# Patient Record
Sex: FEMALE | Race: WHITE | Employment: OTHER | URBAN - METROPOLITAN AREA
[De-identification: names, ages, dates, MRNs, and addresses within clinical notes are randomized per-mention and may not be internally consistent; named-entity substitution may affect disease eponyms.]

---

## 2017-02-27 PROBLEM — R07.2 PRECORDIAL PAIN: Status: ACTIVE | Noted: 2017-02-27

## 2020-04-16 ENCOUNTER — HOSPITAL ENCOUNTER (OUTPATIENT)
Dept: CARDIAC CATH/INVASIVE PROCEDURES | Age: 70
Discharge: HOME | End: 2020-04-16
Attending: INTERNAL MEDICINE | Admitting: INTERNAL MEDICINE
Payer: MEDICARE

## 2020-04-16 VITALS
OXYGEN SATURATION: 97 % | RESPIRATION RATE: 22 BRPM | WEIGHT: 138 LBS | SYSTOLIC BLOOD PRESSURE: 124 MMHG | HEIGHT: 61 IN | HEART RATE: 75 BPM | DIASTOLIC BLOOD PRESSURE: 67 MMHG | BODY MASS INDEX: 26.06 KG/M2 | TEMPERATURE: 97.8 F

## 2020-04-16 LAB
ANION GAP SERPL CALC-SCNC: 6 MMOL/L (ref 7–16)
APTT PPP: 31.2 SEC (ref 24.3–35.4)
ATRIAL RATE: 62 BPM
BUN SERPL-MCNC: 19 MG/DL (ref 8–23)
CALCIUM SERPL-MCNC: 9.2 MG/DL (ref 8.3–10.4)
CALCULATED P AXIS, ECG09: 64 DEGREES
CALCULATED R AXIS, ECG10: -28 DEGREES
CALCULATED T AXIS, ECG11: 38 DEGREES
CHLORIDE SERPL-SCNC: 104 MMOL/L (ref 98–107)
CO2 SERPL-SCNC: 30 MMOL/L (ref 21–32)
CREAT SERPL-MCNC: 0.66 MG/DL (ref 0.6–1)
DIAGNOSIS, 93000: NORMAL
ERYTHROCYTE [DISTWIDTH] IN BLOOD BY AUTOMATED COUNT: 12.7 % (ref 11.9–14.6)
GLUCOSE SERPL-MCNC: 105 MG/DL (ref 65–100)
HCT VFR BLD AUTO: 44 % (ref 35.8–46.3)
HGB BLD-MCNC: 14.1 G/DL (ref 11.7–15.4)
INR PPP: 0.9
MCH RBC QN AUTO: 30.3 PG (ref 26.1–32.9)
MCHC RBC AUTO-ENTMCNC: 32 G/DL (ref 31.4–35)
MCV RBC AUTO: 94.4 FL (ref 79.6–97.8)
NRBC # BLD: 0 K/UL (ref 0–0.2)
P-R INTERVAL, ECG05: 154 MS
PLATELET # BLD AUTO: 335 K/UL (ref 150–450)
PMV BLD AUTO: 9.4 FL (ref 9.4–12.3)
POTASSIUM SERPL-SCNC: 3.9 MMOL/L (ref 3.5–5.1)
PROTHROMBIN TIME: 12.6 SEC (ref 12–14.7)
Q-T INTERVAL, ECG07: 428 MS
QRS DURATION, ECG06: 84 MS
QTC CALCULATION (BEZET), ECG08: 434 MS
RBC # BLD AUTO: 4.66 M/UL (ref 4.05–5.2)
SODIUM SERPL-SCNC: 140 MMOL/L (ref 136–145)
VENTRICULAR RATE, ECG03: 62 BPM
WBC # BLD AUTO: 6.3 K/UL (ref 4.3–11.1)

## 2020-04-16 PROCEDURE — C1769 GUIDE WIRE: HCPCS

## 2020-04-16 PROCEDURE — C1894 INTRO/SHEATH, NON-LASER: HCPCS

## 2020-04-16 PROCEDURE — 74011250636 HC RX REV CODE- 250/636: Performed by: INTERNAL MEDICINE

## 2020-04-16 PROCEDURE — 99152 MOD SED SAME PHYS/QHP 5/>YRS: CPT

## 2020-04-16 PROCEDURE — 74011636320 HC RX REV CODE- 636/320: Performed by: INTERNAL MEDICINE

## 2020-04-16 PROCEDURE — 80048 BASIC METABOLIC PNL TOTAL CA: CPT

## 2020-04-16 PROCEDURE — 77030016699 HC CATH ANGI DX INFN1 CARD -A

## 2020-04-16 PROCEDURE — 93005 ELECTROCARDIOGRAM TRACING: CPT | Performed by: INTERNAL MEDICINE

## 2020-04-16 PROCEDURE — 77030029997 HC DEV COM RDL R BND TELE -B

## 2020-04-16 PROCEDURE — 85027 COMPLETE CBC AUTOMATED: CPT

## 2020-04-16 PROCEDURE — 77030015766

## 2020-04-16 PROCEDURE — 85730 THROMBOPLASTIN TIME PARTIAL: CPT

## 2020-04-16 PROCEDURE — 93458 L HRT ARTERY/VENTRICLE ANGIO: CPT

## 2020-04-16 PROCEDURE — 74011000250 HC RX REV CODE- 250: Performed by: INTERNAL MEDICINE

## 2020-04-16 PROCEDURE — 85610 PROTHROMBIN TIME: CPT

## 2020-04-16 RX ORDER — HEPARIN SODIUM 200 [USP'U]/100ML
3 INJECTION, SOLUTION INTRAVENOUS CONTINUOUS
Status: DISCONTINUED | OUTPATIENT
Start: 2020-04-16 | End: 2020-04-16 | Stop reason: HOSPADM

## 2020-04-16 RX ORDER — SODIUM CHLORIDE 0.9 % (FLUSH) 0.9 %
5-40 SYRINGE (ML) INJECTION EVERY 8 HOURS
Status: DISCONTINUED | OUTPATIENT
Start: 2020-04-16 | End: 2020-04-16 | Stop reason: HOSPADM

## 2020-04-16 RX ORDER — LIDOCAINE HYDROCHLORIDE 10 MG/ML
1-30 INJECTION, SOLUTION EPIDURAL; INFILTRATION; INTRACAUDAL; PERINEURAL ONCE
Status: COMPLETED | OUTPATIENT
Start: 2020-04-16 | End: 2020-04-16

## 2020-04-16 RX ORDER — MIDAZOLAM HYDROCHLORIDE 1 MG/ML
.5-2 INJECTION, SOLUTION INTRAMUSCULAR; INTRAVENOUS
Status: DISCONTINUED | OUTPATIENT
Start: 2020-04-16 | End: 2020-04-16 | Stop reason: HOSPADM

## 2020-04-16 RX ORDER — SODIUM CHLORIDE 9 MG/ML
75 INJECTION, SOLUTION INTRAVENOUS CONTINUOUS
Status: DISCONTINUED | OUTPATIENT
Start: 2020-04-16 | End: 2020-04-16 | Stop reason: HOSPADM

## 2020-04-16 RX ORDER — SODIUM CHLORIDE 0.9 % (FLUSH) 0.9 %
5-40 SYRINGE (ML) INJECTION AS NEEDED
Status: DISCONTINUED | OUTPATIENT
Start: 2020-04-16 | End: 2020-04-16 | Stop reason: HOSPADM

## 2020-04-16 RX ORDER — GUAIFENESIN 100 MG/5ML
81-324 LIQUID (ML) ORAL
Status: DISCONTINUED | OUTPATIENT
Start: 2020-04-16 | End: 2020-04-16 | Stop reason: HOSPADM

## 2020-04-16 RX ORDER — FENTANYL CITRATE 50 UG/ML
25-75 INJECTION, SOLUTION INTRAMUSCULAR; INTRAVENOUS
Status: DISCONTINUED | OUTPATIENT
Start: 2020-04-16 | End: 2020-04-16 | Stop reason: HOSPADM

## 2020-04-16 RX ADMIN — LIDOCAINE HYDROCHLORIDE 4 ML: 10 INJECTION, SOLUTION EPIDURAL; INFILTRATION; INTRACAUDAL; PERINEURAL at 08:57

## 2020-04-16 RX ADMIN — MIDAZOLAM 1 MG: 1 INJECTION INTRAMUSCULAR; INTRAVENOUS at 08:58

## 2020-04-16 RX ADMIN — MIDAZOLAM 1 MG: 1 INJECTION INTRAMUSCULAR; INTRAVENOUS at 08:54

## 2020-04-16 RX ADMIN — HEPARIN SODIUM 3 UNITS/HR: 200 INJECTION, SOLUTION INTRAVENOUS at 08:39

## 2020-04-16 RX ADMIN — SODIUM CHLORIDE 75 ML/HR: 900 INJECTION, SOLUTION INTRAVENOUS at 07:07

## 2020-04-16 RX ADMIN — IOPAMIDOL 80 ML: 755 INJECTION, SOLUTION INTRAVENOUS at 09:08

## 2020-04-16 RX ADMIN — VERAPAMIL HYDROCHLORIDE 2 ML: 5 INJECTION INTRAVENOUS at 09:01

## 2020-04-16 RX ADMIN — FENTANYL CITRATE 25 MCG: 50 INJECTION, SOLUTION INTRAMUSCULAR; INTRAVENOUS at 08:55

## 2020-04-16 NOTE — PROCEDURES
Brief Cardiac Procedure Note    Patient: Kitty Espinosa MRN: [id number]  SSN: [id number]    YOB: 1950  Age: 81 y.o. Sex: female      Date of Procedure: 4/16/2020     Pre-procedure Diagnosis: Atypical Angina    Post-procedure Diagnosis: Non-cardiac Chest Pain    Reason for Procedure: New Onset Angina < or = 2 Months    Procedure: Left Heart Catheterization    Brief Description of Procedure: lhc via right radial, tr    Performed By: Bennie Starks MD     Assistants: none    Anesthesia: Moderate Sedation    Estimated Blood Loss: Less than 10 mL      Specimens: None    Implants: None    Findings: normal cors and ef    Complications: None    Recommendations: Continue medical therapy.     Signed By: Bennie Starks MD     April 16, 2020

## 2020-04-16 NOTE — PROGRESS NOTES
Patient arrived and ambulated to room with no visual problems for planned Lhc/poss with Dr. Liv Barajas. Consent signed and procedure discussed with patient. Time allow for patient to verbalize concerns, and concerns addressed with voiced understanding. Medications and history reviewed with patient. Patient prepped for procedure as ordered. Patient took Aspirin 325 mg at 0400, see medication record. The patient has a fraility score of 3-MANAGING WELL, based on Patient can complete ADL's without difficulty or assistance.

## 2020-04-16 NOTE — ROUTINE PROCESS
TRANSFER - OUT REPORT:    [facility]  Dr. Conde Heads  RRA access    Versed 2mg, fentanyl 25mcg  Diagnostic cath, medical management  R band placed @ [phone] with 12ml air    Verbal report given to [facility] RN(name) on [employer]  being transferred to cpru(unit) for routine progression of care       Report consisted of patients Situation, Background, Assessment and   Recommendations(SBAR). Information from the following report(s) Procedure Summary was reviewed with the receiving nurse. Lines:   Peripheral IV 04/16/20 Anterior;Proximal;Right Forearm (Active)       Peripheral IV 04/16/20 Anterior;Left;Proximal Forearm (Active)        Opportunity for questions and clarification was provided.       Patient transported with:   Registered Nurse

## 2020-04-16 NOTE — PROGRESS NOTES
Terumo band completely deflated. 1105 Terumo band removed from right wrist using sterile technique. Sterile dressing applied. No signs and symptoms of bleeding, oozing or hematoma.

## 2020-04-16 NOTE — PROGRESS NOTES
Report received from [address] [place] [address] Procedural findings communicated. Intra procedural  medication administration reviewed. Progression of care discussed.      Patient received into CPRU [place] 12 post sheath removal.     Access site without bleeding or swelling yes    Dressing dry and intact yes    Patient instructed to limit movement to right upper extremity    Routine post procedural vital signs and site assessment initiated yes

## 2020-04-16 NOTE — DISCHARGE INSTRUCTIONS
HEART CATHETERIZATION/ANGIOGRAPHY DISCHARGE INSTRUCTIONS    1. Check puncture site frequently for swelling or bleeding. If there is any bleeding, lie down and apply pressure over the area with a clean towel or washcloth and call 911. Notify your doctor for any redness, swelling, drainage, or oozing from the puncture site. Notify your doctor for any fever or chills. 2. If the extremity becomes cold, numb, or painful call Dr. Isael Yoon at [phone].  3. Activity should be limited for the next 48 hours. Avoid pushing, pulling and bending of affected wrist for 48 hours. No heavy lifting (anything over 5 pounds) for 3 days. No driving for 48 hours. 4. You may resume your usual diet. Drink more fluids than usual.  5. Have a responsible person drive you home and stay with you for at least 24 hours after your heart catheterization/angiography. 6. You may remove bandage from your right arm  in 24 hours. You may shower in 24 hours. No tub baths, hot tubs, or swimming for 1 week. Do not place any lotions, creams, powders, or ointments over puncture site for 1 week. You may place a clean band-aid over the puncture site each day for 5 days. Change daily. I have read the above instructions and have had the opportunity to ask questions.

## 2020-04-16 NOTE — PROCEDURES
[facility]  CARDIAC CATH    Name:  Bo Pineda  MR#:  [id number]  :  1950  ACCOUNT #:  [de-identified]  DATE OF SERVICE:  2020    PROCEDURES PERFORMED:  Left heart catheterization via the right radial artery with a 5-Icelandic Tiger catheter and angled pigtail. TR band was placed for good hemostasis. PREOPERATIVE DIAGNOSES:  Chest pain, abnormal EKG. POSTOPERATIVE DIAGNOSES:  Noncardiac chest pain. SURGEON:  Parker Rivera MD    ASSISTANT:  None. ESTIMATED BLOOD LOSS:  5 mL. SPECIMENS REMOVED:  None. COMPLICATIONS:  None. IMPLANTS:  None. ANESTHESIA:  Was provided by Dilcia Leonardo RN beginning at 08:54 and concluding at 09:14 with a total of 2 mg Versed and 25 mcg of fentanyl. Vital signs and saturations were stable throughout. FINDINGS:  The left main coronary artery is in normal anatomic position. Bifurcates into LAD and circumflex system. Left-sided vessels are small, but there is no atherosclerosis seen in the left main. The LAD has a proximal small-to-moderate, but long diagonal branch, two smaller distal diagonal branches. There is no atherosclerosis in the LAD system. The circumflex has two distal obtuse marginal branches. There is no atherosclerosis in the circumflex system. The right coronary artery is a dominant vessel in normal anatomic position with a PDA and four small posterolateral obtuse marginal branches. There is no atherosclerosis seen in the circumflex system. Left ventriculogram reveals normal LV systolic function. The ejection fraction is 60%. Left ventricular end-diastolic pressure is measured at 8 mmHg with an opening aortic pressure of 138/74. There is no gradient across the aortic valve. CONCLUSION:  Normal coronary angiography with normal LV systolic function.         Zander Velásquez MD      [id number]  D:  2020 9:21  T:  2020 10:09  JOB #:  [id number]

## 2020-04-16 NOTE — PROGRESS NOTES
Patient up to bedside, vital signs and site stable. Patient ambulated to bathroom without difficulty. Patient voided without difficulty. Vascular site stable. 1115 Discharge instructions and home medications reviewed with patient. Time allowed for questions and answers. 1130 Patient ambulated second time without difficulty. Site stable after ambulation. Peripheral IV sites dc'd without difficulty with tips intact. Patient discharged to home with family.

## 2022-03-19 PROBLEM — R07.2 PRECORDIAL PAIN: Status: ACTIVE | Noted: 2017-02-27

## 2023-01-10 ENCOUNTER — OFFICE VISIT (OUTPATIENT)
Dept: ORTHOPEDIC SURGERY | Age: 73
End: 2023-01-10

## 2023-01-10 VITALS — WEIGHT: 141 LBS | BODY MASS INDEX: 26.62 KG/M2 | HEIGHT: 61 IN

## 2023-01-10 DIAGNOSIS — M17.11 PRIMARY OSTEOARTHRITIS OF RIGHT KNEE: ICD-10-CM

## 2023-01-10 DIAGNOSIS — M25.561 RIGHT KNEE PAIN, UNSPECIFIED CHRONICITY: Primary | ICD-10-CM

## 2023-01-10 RX ORDER — METHYLPREDNISOLONE ACETATE 40 MG/ML
40 INJECTION, SUSPENSION INTRA-ARTICULAR; INTRALESIONAL; INTRAMUSCULAR; SOFT TISSUE ONCE
Status: COMPLETED | OUTPATIENT
Start: 2023-01-10 | End: 2023-01-10

## 2023-01-10 RX ADMIN — METHYLPREDNISOLONE ACETATE 40 MG: 40 INJECTION, SUSPENSION INTRA-ARTICULAR; INTRALESIONAL; INTRAMUSCULAR; SOFT TISSUE at 11:55

## 2023-01-10 NOTE — PROGRESS NOTES
Patient ID:  Gume Bean  [id number]  68 y.o.  1950    Today: January 10, 2023          Chief Complaint:  Right Knee pain    HPI:       Gume Bean is a 81 y.o. female seen for evaluation and treatment of right knee pain. Patient reports a longstanding history of pain involving the knee. The patient complains of knee pain with activities, reports pain as mostly occurring along the joint lines, reports stiffness of the knee with prolonged inactivity, and swelling/pain at the end of the day and after increased physical activity. Generally, symptoms improve with sitting/rest. The pain affects the patients activities of daily living and quality of life. Patient reports progressive pain and instability in the knee. The pain has been present for an extended period of time. Pain ranges from approximately 4-8 in a cyclical fashion with periods of acute exacerbation. Patient has been attempted prior conservative treatment including Over-the-Counter medications including NSAID and/or Tylenol, Activity Modifications, and Corticosteroid Injection. They have had success with prior treatments. Prior Corticosteroid Injection has provided complete and lasting relief. Past Medical History:  Past Medical History:   Diagnosis Date    Arthritis     GERD (gastroesophageal reflux disease)     Thyroid disease        Past Surgical History:  Past Surgical History:   Procedure Laterality Date    APPENDECTOMY      HYSTERECTOMY      ORTHOPEDIC SURGERY      toe surgery, both hands    UROLOGICAL      bladder         Medications:     Prior to Admission medications    Medication Sig Start Date End Date Taking?  Authorizing Provider   KRILL OIL PO Take by mouth    Ar Automatic Reconciliation   albuterol sulfate HFA (PROAIR HFA) 108 (90 Base) MCG/ACT inhaler INHALE 2 PUFFS Q 4 H PRF WHEEZING 10/27/16   Ar Automatic Reconciliation   Hyoscyamine Sulfate SL 0.125 MG SUBL Place 0.125 mg under the tongue every 4 hours as needed Ar Automatic Reconciliation   levothyroxine (SYNTHROID) 100 MCG tablet Take by mouth every morning (before breakfast)    Ar Automatic Reconciliation   pantoprazole (PROTONIX) 20 MG tablet Take 20 mg by mouth daily    Ar Automatic Reconciliation   sulfamethoxazole-trimethoprim (BACTRIM;SEPTRA) 400-80 MG per tablet Take 1 tablet by mouth daily as needed    Ar Automatic Reconciliation       Family History:     Family History   Problem Relation Age of Onset    Heart Attack Father     Heart Attack Brother 39    Heart Attack Sister 36       Social History:      Social History     Tobacco Use    Smoking status: Never    Smokeless tobacco: Never   Substance Use Topics    Alcohol use: Not on file           Allergies: Allergies   Allergen Reactions    Levofloxacin Anaphylaxis    Morphine Nausea And Vomiting          Vitals:   Ht 5' 1\" (1.549 m)   Wt 141 lb (64 kg)   BMI 26.64 kg/m²     ROS:   Review of Systems         Objective:   General: Patient is awake and in no acute distress  Psych: Mood and affect appropriate  HEENT: Normocephalic. Atramatic. Pupils equal, round and reactive. Sclera normal.   Neck: Supple without obvious mass   Chest: Symmetric  Lungs:  Breathing non-labored. No tachypnea noted. Abdomen: Soft on gross examination without obvious distention. Neuro: No obvious neurologic deficit. Grossly moves bilateral upper extremities without motor or sensory deficits. No gross weakness noted in the lower extremities. No hyporeflexia or hyperreflexia noted. Vascular: No gross arterial or venous deficiency noted. DP and PT pulses are palpable in the lower extremities  Lymphatic: No lymphedema noted in the lower extremities. Skin: No prior incisions noted about the right knee. No obvious rashes noted about the area. No skin changes noted about the knee or about the adjacent thigh or leg. Extremities:  Patient ambulates with an antalgic gait. There is pain with ROM of the right knee.  Range of motion is 0-120. Trace effusion noted in the knee. Patellofemoral crepitus is present. Distally the patient shows no neurologic deficit. Xrays (obtained either today or previously):    Heading: XR Knee 3/4 View  Views: AP knee, skiers PA knee, lateral knee, sunrise view right knee  Clinical indication: Right Knee Pain   Findings: Xrays of the knees obtained today or previously show tricompartmental bone-on-bone arthritic changes with associated osteophyte formation and subchondral sclerosis. Impression: Right Knee osteoarthritis    Johnie Fenton MD    Assessment:   1. Arthritis of the Right knee    Plan:   Differential diagnosis and treatment options have been discussed with the patient. Risks, benefits and alternatives of each were discussed and patient questions answered. The patient understands the nature of knee arthritis and that this is generally a progressive condition. We discussed oral anti-inflammatory medications, activity modifications, physical therapy, corticosteroid injections, weight loss (if appropriate), and surgery. We discussed that given the degenerative nature of the joint that in most cases surgery is the definitive treatment for this condition. We did discuss some of the details of surgery along with some of the risks, benefits and alternatives. At this point the patient is a candidate for surgery however they would like to try to postpone surgery at this point in time if possible. At this point the patient has failed the aforementioned treatment modalities. At this point the patient would like to proceed with Corticosteroid Injection. TREATMENT:    Steroid Injection - Risks, benefits, and alternatives of corticosteroid injection were discussed. After any questions were address the patient wished to proceed with injection. After prepping the injection site and right knee was injected with 1cc of 40mg Depomedrol/3cc marcaine. Patient tolerated the procedure well.  Patient is instructed to ice the joint for next few days if they experience discomfort.  The patient will followup as directed or as needed        Signed By: Torres Vizcarra MD  January 10, 2023

## 2023-05-11 ENCOUNTER — HOSPITAL ENCOUNTER (OUTPATIENT)
Dept: MRI IMAGING | Age: 73
Discharge: HOME | End: 2023-05-11
Payer: MEDICARE

## 2023-05-11 ENCOUNTER — TRANSCRIBE ORDERS (OUTPATIENT)
Dept: SCHEDULING | Age: 73
End: 2023-05-11

## 2023-05-11 DIAGNOSIS — I77.6 AORTITIS (HCC): ICD-10-CM

## 2023-05-11 DIAGNOSIS — I77.810 THORACIC AORTIC ECTASIA (HCC): ICD-10-CM

## 2023-05-11 DIAGNOSIS — I77.812: ICD-10-CM

## 2023-05-11 DIAGNOSIS — I77.6 AORTITIS (HCC): Primary | ICD-10-CM

## 2023-05-11 PROCEDURE — 6360000004 HC RX CONTRAST MEDICATION: Performed by: INTERNAL MEDICINE

## 2023-05-11 PROCEDURE — A9579 GAD-BASE MR CONTRAST NOS,1ML: HCPCS | Performed by: INTERNAL MEDICINE

## 2023-05-11 PROCEDURE — C8911 MRA W/O FOL W/CONT, CHEST: HCPCS

## 2023-05-11 PROCEDURE — 2580000003 HC RX 258: Performed by: INTERNAL MEDICINE

## 2023-05-11 RX ORDER — SODIUM CHLORIDE 0.9 % (FLUSH) 0.9 %
10 SYRINGE (ML) INJECTION AS NEEDED
Status: DISCONTINUED | OUTPATIENT
Start: 2023-05-11 | End: 2023-05-15 | Stop reason: HOSPADM

## 2023-05-11 RX ADMIN — GADOTERIDOL 15 ML: 279.3 INJECTION, SOLUTION INTRAVENOUS at 15:27

## 2023-05-11 RX ADMIN — SODIUM CHLORIDE, PRESERVATIVE FREE 10 ML: 5 INJECTION INTRAVENOUS at 15:30

## 2023-05-14 ENCOUNTER — APPOINTMENT (OUTPATIENT)
Dept: GENERAL RADIOLOGY | Age: 73
End: 2023-05-14
Payer: MEDICARE

## 2023-05-14 ENCOUNTER — HOSPITAL ENCOUNTER (EMERGENCY)
Age: 73
Discharge: HOME | End: 2023-05-14
Attending: EMERGENCY MEDICINE
Payer: MEDICARE

## 2023-05-14 ENCOUNTER — APPOINTMENT (OUTPATIENT)
Dept: CT IMAGING | Age: 73
End: 2023-05-14
Payer: MEDICARE

## 2023-05-14 VITALS
WEIGHT: 134 LBS | OXYGEN SATURATION: 98 % | DIASTOLIC BLOOD PRESSURE: 80 MMHG | BODY MASS INDEX: 26.31 KG/M2 | RESPIRATION RATE: 14 BRPM | HEIGHT: 60 IN | SYSTOLIC BLOOD PRESSURE: 130 MMHG | HEART RATE: 66 BPM | TEMPERATURE: 98 F

## 2023-05-14 DIAGNOSIS — I77.6 AORTITIS (HCC): Primary | ICD-10-CM

## 2023-05-14 DIAGNOSIS — R10.84 GENERALIZED ABDOMINAL PAIN: ICD-10-CM

## 2023-05-14 PROBLEM — K21.9 GASTROESOPHAGEAL REFLUX DISEASE: Status: ACTIVE | Noted: 2021-08-14

## 2023-05-14 PROBLEM — K58.0 IRRITABLE BOWEL SYNDROME WITH DIARRHEA: Status: ACTIVE | Noted: 2021-08-14

## 2023-05-14 PROBLEM — E03.9 HYPOTHYROIDISM, ADULT: Status: ACTIVE | Noted: 2021-08-14

## 2023-05-14 LAB
ALBUMIN SERPL-MCNC: 3.6 G/DL (ref 3.2–4.6)
ALBUMIN/GLOB SERPL: 0.9 (ref 0.4–1.6)
ALP SERPL-CCNC: 86 U/L (ref 50–136)
ALT SERPL-CCNC: 24 U/L (ref 12–65)
ANION GAP SERPL CALC-SCNC: 4 MMOL/L (ref 2–11)
AST SERPL-CCNC: 13 U/L (ref 15–37)
BILIRUB SERPL-MCNC: 0.4 MG/DL (ref 0.2–1.1)
BUN SERPL-MCNC: 12 MG/DL (ref 8–23)
CALCIUM SERPL-MCNC: 9.5 MG/DL (ref 8.3–10.4)
CHLORIDE SERPL-SCNC: 107 MMOL/L (ref 101–110)
CO2 SERPL-SCNC: 28 MMOL/L (ref 21–32)
CREAT SERPL-MCNC: 0.61 MG/DL (ref 0.6–1)
EKG ATRIAL RATE: 66 BPM
EKG DIAGNOSIS: NORMAL
EKG P AXIS: 60 DEGREES
EKG P-R INTERVAL: 148 MS
EKG Q-T INTERVAL: 397 MS
EKG QRS DURATION: 95 MS
EKG QTC CALCULATION (BAZETT): 420 MS
EKG R AXIS: -24 DEGREES
EKG T AXIS: 52 DEGREES
EKG VENTRICULAR RATE: 67 BPM
ERYTHROCYTE [DISTWIDTH] IN BLOOD BY AUTOMATED COUNT: 13.3 % (ref 11.9–14.6)
GLOBULIN SER CALC-MCNC: 4.1 G/DL (ref 2.8–4.5)
GLUCOSE SERPL-MCNC: 99 MG/DL (ref 65–100)
HCT VFR BLD AUTO: 41.4 % (ref 35.8–46.3)
HGB BLD-MCNC: 13.3 G/DL (ref 11.7–15.4)
LIPASE SERPL-CCNC: 92 U/L (ref 73–393)
MAGNESIUM SERPL-MCNC: 2.2 MG/DL (ref 1.8–2.4)
MCH RBC QN AUTO: 29.8 PG (ref 26.1–32.9)
MCHC RBC AUTO-ENTMCNC: 32.1 G/DL (ref 31.4–35)
MCV RBC AUTO: 92.8 FL (ref 82–102)
NRBC # BLD: 0 K/UL (ref 0–0.2)
PLATELET # BLD AUTO: 354 K/UL (ref 150–450)
PMV BLD AUTO: 9.1 FL (ref 9.4–12.3)
POTASSIUM SERPL-SCNC: 3.8 MMOL/L (ref 3.5–5.1)
PROT SERPL-MCNC: 7.7 G/DL (ref 6.3–8.2)
RBC # BLD AUTO: 4.46 M/UL (ref 4.05–5.2)
SODIUM SERPL-SCNC: 139 MMOL/L (ref 133–143)
TROPONIN I SERPL HS-MCNC: 4.4 PG/ML (ref 0–14)
TROPONIN I SERPL HS-MCNC: 5.2 PG/ML (ref 0–14)
WBC # BLD AUTO: 4.9 K/UL (ref 4.3–11.1)

## 2023-05-14 PROCEDURE — 94762 N-INVAS EAR/PLS OXIMTRY CONT: CPT

## 2023-05-14 PROCEDURE — 83690 ASSAY OF LIPASE: CPT

## 2023-05-14 PROCEDURE — 71045 X-RAY EXAM CHEST 1 VIEW: CPT

## 2023-05-14 PROCEDURE — 71260 CT THORAX DX C+: CPT

## 2023-05-14 PROCEDURE — 83735 ASSAY OF MAGNESIUM: CPT

## 2023-05-14 PROCEDURE — 85027 COMPLETE CBC AUTOMATED: CPT

## 2023-05-14 PROCEDURE — 93005 ELECTROCARDIOGRAM TRACING: CPT | Performed by: EMERGENCY MEDICINE

## 2023-05-14 PROCEDURE — 80053 COMPREHEN METABOLIC PANEL: CPT

## 2023-05-14 PROCEDURE — 99285 EMERGENCY DEPT VISIT HI MDM: CPT

## 2023-05-14 PROCEDURE — 2580000003 HC RX 258: Performed by: EMERGENCY MEDICINE

## 2023-05-14 PROCEDURE — 84484 ASSAY OF TROPONIN QUANT: CPT

## 2023-05-14 PROCEDURE — 93010 ELECTROCARDIOGRAM REPORT: CPT | Performed by: INTERNAL MEDICINE

## 2023-05-14 PROCEDURE — 6360000004 HC RX CONTRAST MEDICATION: Performed by: EMERGENCY MEDICINE

## 2023-05-14 RX ORDER — OXYCODONE HYDROCHLORIDE 5 MG/1
5 TABLET ORAL EVERY 4 HOURS PRN
Qty: 19 TABLET | Refills: 0 | Status: SHIPPED | OUTPATIENT
Start: 2023-05-14 | End: 2023-05-17

## 2023-05-14 RX ORDER — SODIUM CHLORIDE, SODIUM LACTATE, POTASSIUM CHLORIDE, AND CALCIUM CHLORIDE .6; .31; .03; .02 G/100ML; G/100ML; G/100ML; G/100ML
1000 INJECTION, SOLUTION INTRAVENOUS ONCE
Status: COMPLETED | OUTPATIENT
Start: 2023-05-14 | End: 2023-05-14

## 2023-05-14 RX ORDER — SODIUM CHLORIDE 0.9 % (FLUSH) 0.9 %
10 SYRINGE (ML) INJECTION
Status: COMPLETED | OUTPATIENT
Start: 2023-05-14 | End: 2023-05-14

## 2023-05-14 RX ORDER — PREDNISONE 10 MG/1
TABLET ORAL
COMMUNITY
Start: 2023-05-12

## 2023-05-14 RX ADMIN — SODIUM CHLORIDE, POTASSIUM CHLORIDE, SODIUM LACTATE AND CALCIUM CHLORIDE 1000 ML: 600; 310; 30; 20 INJECTION, SOLUTION INTRAVENOUS at 11:13

## 2023-05-14 RX ADMIN — IOPAMIDOL 100 ML: 755 INJECTION, SOLUTION INTRAVENOUS at 12:24

## 2023-05-14 RX ADMIN — SODIUM CHLORIDE, PRESERVATIVE FREE 10 ML: 5 INJECTION INTRAVENOUS at 12:24

## 2023-05-14 ASSESSMENT — ENCOUNTER SYMPTOMS
SHORTNESS OF BREATH: 0
EYE ITCHING: 0
COUGH: 0
EYE REDNESS: 0
ABDOMINAL PAIN: 1
NAUSEA: 0
BACK PAIN: 0
EYE PAIN: 0
WHEEZING: 0
SINUS PAIN: 0
VOMITING: 0
TROUBLE SWALLOWING: 0
CONSTIPATION: 0
DIARRHEA: 0

## 2023-05-14 ASSESSMENT — PAIN SCALES - GENERAL: PAINLEVEL_OUTOF10: 7

## 2023-05-14 ASSESSMENT — PAIN DESCRIPTION - LOCATION: LOCATION: ABDOMEN;CHEST

## 2023-05-14 ASSESSMENT — PAIN - FUNCTIONAL ASSESSMENT: PAIN_FUNCTIONAL_ASSESSMENT: 0-10

## 2023-05-23 ENCOUNTER — OFFICE VISIT (OUTPATIENT)
Dept: VASCULAR SURGERY | Age: 73
End: 2023-05-23
Payer: MEDICARE

## 2023-05-23 VITALS
DIASTOLIC BLOOD PRESSURE: 83 MMHG | HEART RATE: 64 BPM | SYSTOLIC BLOOD PRESSURE: 162 MMHG | OXYGEN SATURATION: 99 % | BODY MASS INDEX: 26.31 KG/M2 | WEIGHT: 134 LBS | HEIGHT: 60 IN

## 2023-05-23 DIAGNOSIS — I71.43 INFRARENAL ABDOMINAL AORTIC ANEURYSM (AAA) WITHOUT RUPTURE (HCC): Primary | ICD-10-CM

## 2023-05-23 PROCEDURE — G8417 CALC BMI ABV UP PARAM F/U: HCPCS | Performed by: SURGERY

## 2023-05-23 PROCEDURE — G8427 DOCREV CUR MEDS BY ELIG CLIN: HCPCS | Performed by: SURGERY

## 2023-05-23 PROCEDURE — 99204 OFFICE O/P NEW MOD 45 MIN: CPT | Performed by: SURGERY

## 2023-05-23 PROCEDURE — 1036F TOBACCO NON-USER: CPT | Performed by: SURGERY

## 2023-05-23 PROCEDURE — 3017F COLORECTAL CA SCREEN DOC REV: CPT | Performed by: SURGERY

## 2023-05-23 PROCEDURE — G8400 PT W/DXA NO RESULTS DOC: HCPCS | Performed by: SURGERY

## 2023-05-23 PROCEDURE — 1090F PRES/ABSN URINE INCON ASSESS: CPT | Performed by: SURGERY

## 2023-05-23 PROCEDURE — 1123F ACP DISCUSS/DSCN MKR DOCD: CPT | Performed by: SURGERY

## 2023-05-23 NOTE — PROGRESS NOTES
[address]   [address] [facility] FAX: [phone]    Matt Cates [facility]  1950    Chief Complaint   Patient presents with    New Patient           HPI   Ms. Celia Nicholas is a 81y.o. year old female who with vague abdominal pain for the last several months. She denies any tobacco abuse. She underwent a CAT scan did show some inflammatory area around the aorta. Recently started on steroids. Current Outpatient Medications   Medication Sig Dispense Refill    predniSONE (DELTASONE) 10 MG tablet Take 30 mg daily for 1 week , then 25 mg daily (2 1/2 tabs) for 1 week, then 20 mg daily for 1 week, then 15 mg daily for 1 week, then stay on 10 mg daily until follow up. KRILL OIL PO Take by mouth      albuterol sulfate HFA (PROVENTIL;VENTOLIN;PROAIR) 108 (90 Base) MCG/ACT inhaler INHALE 2 PUFFS Q 4 H PRF WHEEZING      Hyoscyamine Sulfate SL 0.125 MG SUBL Place 0.125 mg under the tongue every 4 hours as needed      levothyroxine (SYNTHROID) 100 MCG tablet Take by mouth every morning (before breakfast)      pantoprazole (PROTONIX) 20 MG tablet Take 1 tablet by mouth daily      sulfamethoxazole-trimethoprim (BACTRIM;SEPTRA) 400-80 MG per tablet Take 1 tablet by mouth daily as needed (Patient not taking: Reported on 5/23/2023)       No current facility-administered medications for this visit.      Allergies   Allergen Reactions    Levofloxacin Anaphylaxis    Morphine Nausea And Vomiting     Past Medical History:   Diagnosis Date    Arthritis     GERD (gastroesophageal reflux disease)     Thyroid disease      Family History   Problem Relation Age of Onset    Heart Attack Father     Heart Attack Brother 39    Heart Attack Sister 36     Past Surgical History:   Procedure Laterality Date    APPENDECTOMY      HYSTERECTOMY (CERVIX STATUS UNKNOWN)      ORTHOPEDIC SURGERY      toe surgery, both hands    UROLOGICAL SURGERY      bladder      Social History     Tobacco Use

## 2023-06-06 ENCOUNTER — OFFICE VISIT (OUTPATIENT)
Dept: VASCULAR SURGERY | Age: 73
End: 2023-06-06
Payer: MEDICARE

## 2023-06-06 VITALS
BODY MASS INDEX: 27.09 KG/M2 | HEART RATE: 61 BPM | HEIGHT: 60 IN | SYSTOLIC BLOOD PRESSURE: 153 MMHG | OXYGEN SATURATION: 96 % | DIASTOLIC BLOOD PRESSURE: 78 MMHG | WEIGHT: 138 LBS

## 2023-06-06 DIAGNOSIS — I71.43 INFRARENAL ABDOMINAL AORTIC ANEURYSM (AAA) WITHOUT RUPTURE (HCC): Primary | ICD-10-CM

## 2023-06-06 PROCEDURE — G8427 DOCREV CUR MEDS BY ELIG CLIN: HCPCS | Performed by: SURGERY

## 2023-06-06 PROCEDURE — 1123F ACP DISCUSS/DSCN MKR DOCD: CPT | Performed by: SURGERY

## 2023-06-06 PROCEDURE — 1036F TOBACCO NON-USER: CPT | Performed by: SURGERY

## 2023-06-06 PROCEDURE — 99213 OFFICE O/P EST LOW 20 MIN: CPT | Performed by: SURGERY

## 2023-06-06 PROCEDURE — G8400 PT W/DXA NO RESULTS DOC: HCPCS | Performed by: SURGERY

## 2023-06-06 PROCEDURE — G8417 CALC BMI ABV UP PARAM F/U: HCPCS | Performed by: SURGERY

## 2023-06-06 PROCEDURE — 3017F COLORECTAL CA SCREEN DOC REV: CPT | Performed by: SURGERY

## 2023-06-06 PROCEDURE — 1090F PRES/ABSN URINE INCON ASSESS: CPT | Performed by: SURGERY

## 2023-06-06 NOTE — PROGRESS NOTES
[address]   [facility] FAX: [phone]    Gilford Booty  : 1950    Chief Complaint:     History of Present Illness:   Patient follows up today for follow-up aortic ultrasound with history of inflammatory aortic seen on CAT scan. Patient denies any pain she is currently on steroids. CURRENT MEDICATIONS:  Current Outpatient Medications   Medication Sig Dispense Refill    predniSONE (DELTASONE) 10 MG tablet Take 30 mg daily for 1 week , then 25 mg daily (2 1/2 tabs) for 1 week, then 20 mg daily for 1 week, then 15 mg daily for 1 week, then stay on 10 mg daily until follow up. KRILL OIL PO Take by mouth      levothyroxine (SYNTHROID) 100 MCG tablet Take by mouth every morning (before breakfast)      pantoprazole (PROTONIX) 20 MG tablet Take 1 tablet by mouth daily      albuterol sulfate HFA (PROVENTIL;VENTOLIN;PROAIR) 108 (90 Base) MCG/ACT inhaler INHALE 2 PUFFS Q 4 H PRF WHEEZING (Patient not taking: Reported on 2023)      Hyoscyamine Sulfate SL 0.125 MG SUBL Place 0.125 mg under the tongue every 4 hours as needed (Patient not taking: Reported on 2023)      sulfamethoxazole-trimethoprim (BACTRIM;SEPTRA) 400-80 MG per tablet Take 1 tablet by mouth daily as needed (Patient not taking: Reported on 2023)       No current facility-administered medications for this visit. Past Medical History:   Diagnosis Date    Arthritis     GERD (gastroesophageal reflux disease)     Thyroid disease        Physical Examination:   Height: 5' (1.524 m), Weight - Scale: 138 lb (62.6 kg), BP: (!) 153/78    Constitutional: she appears well-developed. No distress. HENT:   Head: Atraumatic. Eyes: Pupils are equal, round, and reactive to light. Neck: Normal range of motion. Cardiovascular: Regular rhythm. Pulmonary/Chest: Effort normal and breath sounds normal. No respiratory distress. Abdominal: Soft.  Bowel sounds are normal. she exhibits no

## 2023-07-03 ENCOUNTER — HOSPITAL ENCOUNTER (OUTPATIENT)
Dept: CT IMAGING | Age: 73
Discharge: HOME | End: 2023-07-06
Attending: SURGERY
Payer: MEDICARE

## 2023-07-03 DIAGNOSIS — I71.43 INFRARENAL ABDOMINAL AORTIC ANEURYSM (AAA) WITHOUT RUPTURE (HCC): ICD-10-CM

## 2023-07-03 LAB — CREAT BLD-MCNC: 0.53 MG/DL (ref 0.8–1.5)

## 2023-07-03 PROCEDURE — 74174 CTA ABD&PLVS W/CONTRAST: CPT

## 2023-07-03 PROCEDURE — 82565 ASSAY OF CREATININE: CPT

## 2023-07-03 PROCEDURE — 2580000003 HC RX 258: Performed by: SURGERY

## 2023-07-03 PROCEDURE — 6360000004 HC RX CONTRAST MEDICATION: Performed by: SURGERY

## 2023-07-03 RX ORDER — SODIUM CHLORIDE 0.9 % (FLUSH) 0.9 %
10 SYRINGE (ML) INJECTION
Status: COMPLETED | OUTPATIENT
Start: 2023-07-03 | End: 2023-07-03

## 2023-07-03 RX ORDER — 0.9 % SODIUM CHLORIDE 0.9 %
100 INTRAVENOUS SOLUTION INTRAVENOUS
Status: COMPLETED | OUTPATIENT
Start: 2023-07-03 | End: 2023-07-03

## 2023-07-03 RX ADMIN — SODIUM CHLORIDE 100 ML: 9 INJECTION, SOLUTION INTRAVENOUS at 09:50

## 2023-07-03 RX ADMIN — SODIUM CHLORIDE, PRESERVATIVE FREE 10 ML: 5 INJECTION INTRAVENOUS at 09:50

## 2023-07-03 RX ADMIN — IOPAMIDOL 100 ML: 755 INJECTION, SOLUTION INTRAVENOUS at 09:49

## 2023-07-07 ENCOUNTER — OFFICE VISIT (OUTPATIENT)
Dept: VASCULAR SURGERY | Age: 73
End: 2023-07-07
Payer: MEDICARE

## 2023-07-07 VITALS
SYSTOLIC BLOOD PRESSURE: 147 MMHG | WEIGHT: 139.6 LBS | DIASTOLIC BLOOD PRESSURE: 82 MMHG | HEIGHT: 60 IN | BODY MASS INDEX: 27.41 KG/M2 | HEART RATE: 69 BPM | OXYGEN SATURATION: 98 %

## 2023-07-07 DIAGNOSIS — I73.9 PVD (PERIPHERAL VASCULAR DISEASE) WITH CLAUDICATION (HCC): Primary | ICD-10-CM

## 2023-07-07 PROCEDURE — 1090F PRES/ABSN URINE INCON ASSESS: CPT | Performed by: SURGERY

## 2023-07-07 PROCEDURE — G8427 DOCREV CUR MEDS BY ELIG CLIN: HCPCS | Performed by: SURGERY

## 2023-07-07 PROCEDURE — G8417 CALC BMI ABV UP PARAM F/U: HCPCS | Performed by: SURGERY

## 2023-07-07 PROCEDURE — 1036F TOBACCO NON-USER: CPT | Performed by: SURGERY

## 2023-07-07 PROCEDURE — 3017F COLORECTAL CA SCREEN DOC REV: CPT | Performed by: SURGERY

## 2023-07-07 PROCEDURE — 1123F ACP DISCUSS/DSCN MKR DOCD: CPT | Performed by: SURGERY

## 2023-07-07 PROCEDURE — G8400 PT W/DXA NO RESULTS DOC: HCPCS | Performed by: SURGERY

## 2023-07-07 PROCEDURE — 99213 OFFICE O/P EST LOW 20 MIN: CPT | Performed by: SURGERY

## 2023-07-07 NOTE — PROGRESS NOTES
[address] [facility] [address]   [address] FAX: [phone]    Madhav Amezcua  : 1950    Chief Complaint: Follow-up    History of Present Illness:   Patient follows up today for with follow-up CT scan. Patient was initially diagnosed with aortitis as she had some inflammatory changes around her aorta. She was placed on steroids. She states her abdominal pain is improved    CURRENT MEDICATIONS:  Current Outpatient Medications   Medication Sig Dispense Refill    predniSONE (DELTASONE) 10 MG tablet Take 30 mg daily for 1 week , then 25 mg daily (2 1/2 tabs) for 1 week, then 20 mg daily for 1 week, then 15 mg daily for 1 week, then stay on 10 mg daily until follow up. KRILL OIL PO Take by mouth      albuterol sulfate HFA (PROVENTIL;VENTOLIN;PROAIR) 108 (90 Base) MCG/ACT inhaler INHALE 2 PUFFS Q 4 H PRF WHEEZING as needed      levothyroxine (SYNTHROID) 100 MCG tablet Take by mouth every morning (before breakfast)      pantoprazole (PROTONIX) 20 MG tablet Take 1 tablet by mouth daily      Hyoscyamine Sulfate SL 0.125 MG SUBL Place 0.125 mg under the tongue every 4 hours as needed (Patient not taking: Reported on 2023)      sulfamethoxazole-trimethoprim (BACTRIM;SEPTRA) 400-80 MG per tablet Take 1 tablet by mouth daily as needed (Patient not taking: Reported on 2023)       No current facility-administered medications for this visit. Past Medical History:   Diagnosis Date    Arthritis     GERD (gastroesophageal reflux disease)     Thyroid disease        Physical Examination:   Height: 5' (1.524 m), Weight - Scale: 139 lb 9.6 oz (63.3 kg), BP: (!) 147/82    Constitutional: she appears well-developed. No distress. HENT:   Head: Atraumatic. Eyes: Pupils are equal, round, and reactive to light. Neck: Normal range of motion. Cardiovascular: Regular rhythm. Pulmonary/Chest: Effort normal and breath sounds normal. No respiratory distress.    Abdominal:

## 2023-08-22 ENCOUNTER — OFFICE VISIT (OUTPATIENT)
Dept: ORTHOPEDIC SURGERY | Age: 73
End: 2023-08-22

## 2023-08-22 DIAGNOSIS — M17.11 PRIMARY OSTEOARTHRITIS OF RIGHT KNEE: Primary | ICD-10-CM

## 2023-08-22 RX ORDER — METHYLPREDNISOLONE ACETATE 40 MG/ML
40 INJECTION, SUSPENSION INTRA-ARTICULAR; INTRALESIONAL; INTRAMUSCULAR; SOFT TISSUE ONCE
Status: COMPLETED | OUTPATIENT
Start: 2023-08-22 | End: 2023-08-22

## 2023-08-22 RX ADMIN — METHYLPREDNISOLONE ACETATE 40 MG: 40 INJECTION, SUSPENSION INTRA-ARTICULAR; INTRALESIONAL; INTRAMUSCULAR; SOFT TISSUE at 09:03

## 2024-01-05 ENCOUNTER — OFFICE VISIT (OUTPATIENT)
Dept: VASCULAR SURGERY | Age: 74
End: 2024-01-05
Payer: MEDICARE

## 2024-01-05 VITALS
WEIGHT: 141 LBS | DIASTOLIC BLOOD PRESSURE: 70 MMHG | BODY MASS INDEX: 27.68 KG/M2 | OXYGEN SATURATION: 97 % | HEART RATE: 84 BPM | SYSTOLIC BLOOD PRESSURE: 152 MMHG | HEIGHT: 60 IN

## 2024-01-05 DIAGNOSIS — I73.9 PVD (PERIPHERAL VASCULAR DISEASE) WITH CLAUDICATION (HCC): Primary | ICD-10-CM

## 2024-01-05 PROCEDURE — 1090F PRES/ABSN URINE INCON ASSESS: CPT | Performed by: SURGERY

## 2024-01-05 PROCEDURE — 3017F COLORECTAL CA SCREEN DOC REV: CPT | Performed by: SURGERY

## 2024-01-05 PROCEDURE — G8428 CUR MEDS NOT DOCUMENT: HCPCS | Performed by: SURGERY

## 2024-01-05 PROCEDURE — G8484 FLU IMMUNIZE NO ADMIN: HCPCS | Performed by: SURGERY

## 2024-01-05 PROCEDURE — 1036F TOBACCO NON-USER: CPT | Performed by: SURGERY

## 2024-01-05 PROCEDURE — 99213 OFFICE O/P EST LOW 20 MIN: CPT | Performed by: SURGERY

## 2024-01-05 PROCEDURE — G8400 PT W/DXA NO RESULTS DOC: HCPCS | Performed by: SURGERY

## 2024-01-05 PROCEDURE — G8417 CALC BMI ABV UP PARAM F/U: HCPCS | Performed by: SURGERY

## 2024-01-05 PROCEDURE — 1123F ACP DISCUSS/DSCN MKR DOCD: CPT | Performed by: SURGERY

## 2024-01-05 NOTE — PROGRESS NOTES
[address] [address]  [phone] FAX: [phone]    Renetta Cope  : 1950    Chief Complaint:     History of Present Illness:   Patient follows up duplex    CURRENT MEDICATIONS:  Current Outpatient Medications   Medication Sig Dispense Refill    predniSONE (DELTASONE) 10 MG tablet Take 30 mg daily for 1 week , then 25 mg daily (2 1/2 tabs) for 1 week, then 20 mg daily for 1 week, then 15 mg daily for 1 week, then stay on 10 mg daily until follow up.      KRILL OIL PO Take by mouth      albuterol sulfate HFA (PROVENTIL;VENTOLIN;PROAIR) 108 (90 Base) MCG/ACT inhaler INHALE 2 PUFFS Q 4 H PRF WHEEZING as needed      Hyoscyamine Sulfate SL 0.125 MG SUBL Place 0.125 mg under the tongue every 4 hours as needed (Patient not taking: Reported on 2023)      levothyroxine (SYNTHROID) 100 MCG tablet Take by mouth every morning (before breakfast)      pantoprazole (PROTONIX) 20 MG tablet Take 1 tablet by mouth daily      sulfamethoxazole-trimethoprim (BACTRIM;SEPTRA) 400-80 MG per tablet Take 1 tablet by mouth daily as needed (Patient not taking: Reported on 2023)       No current facility-administered medications for this visit.       Past Medical History:   Diagnosis Date    Arthritis     GERD (gastroesophageal reflux disease)     Thyroid disease        Physical Examination:   Height: 1.524 m (5'), Weight - Scale: 64 kg (141 lb), BP: (!) 152/70    Constitutional: she appears well-developed. No distress.   HENT:   Head: Atraumatic.   Eyes: Pupils are equal, round, and reactive to light.   Neck: Normal range of motion.   Cardiovascular: Regular rhythm.    Pulmonary/Chest: Effort normal and breath sounds normal. No respiratory distress.   Abdominal: Soft. Bowel sounds are normal. she exhibits no distension. There is no tenderness. There is no guarding. No hernia.   Musculoskeletal: Normal range of motion.   Neurological: She is alert. CN II- XII grossly intact

## 2024-01-08 ENCOUNTER — TELEPHONE (OUTPATIENT)
Dept: ORTHOPEDIC SURGERY | Age: 74
End: 2024-01-08

## 2024-01-08 NOTE — TELEPHONE ENCOUNTER
I called and spoke to patient. I have scheduled her an appointment to come in and see Dr Lawson on 1/23.

## 2024-01-23 ENCOUNTER — OFFICE VISIT (OUTPATIENT)
Dept: ORTHOPEDIC SURGERY | Age: 74
End: 2024-01-23
Payer: MEDICARE

## 2024-01-23 DIAGNOSIS — M17.11 PRIMARY OSTEOARTHRITIS OF RIGHT KNEE: Primary | ICD-10-CM

## 2024-01-23 PROCEDURE — G8400 PT W/DXA NO RESULTS DOC: HCPCS | Performed by: ORTHOPAEDIC SURGERY

## 2024-01-23 PROCEDURE — 20610 DRAIN/INJ JOINT/BURSA W/O US: CPT | Performed by: ORTHOPAEDIC SURGERY

## 2024-01-23 PROCEDURE — 1123F ACP DISCUSS/DSCN MKR DOCD: CPT | Performed by: ORTHOPAEDIC SURGERY

## 2024-01-23 PROCEDURE — 3017F COLORECTAL CA SCREEN DOC REV: CPT | Performed by: ORTHOPAEDIC SURGERY

## 2024-01-23 PROCEDURE — 1090F PRES/ABSN URINE INCON ASSESS: CPT | Performed by: ORTHOPAEDIC SURGERY

## 2024-01-23 PROCEDURE — G8417 CALC BMI ABV UP PARAM F/U: HCPCS | Performed by: ORTHOPAEDIC SURGERY

## 2024-01-23 PROCEDURE — G8428 CUR MEDS NOT DOCUMENT: HCPCS | Performed by: ORTHOPAEDIC SURGERY

## 2024-01-23 PROCEDURE — 99214 OFFICE O/P EST MOD 30 MIN: CPT | Performed by: ORTHOPAEDIC SURGERY

## 2024-01-23 PROCEDURE — G8484 FLU IMMUNIZE NO ADMIN: HCPCS | Performed by: ORTHOPAEDIC SURGERY

## 2024-01-23 PROCEDURE — 1036F TOBACCO NON-USER: CPT | Performed by: ORTHOPAEDIC SURGERY

## 2024-01-23 RX ORDER — METHYLPREDNISOLONE ACETATE 40 MG/ML
40 INJECTION, SUSPENSION INTRA-ARTICULAR; INTRALESIONAL; INTRAMUSCULAR; SOFT TISSUE ONCE
Qty: 1 ML | Refills: 0
Start: 2024-01-23 | End: 2024-01-23

## 2024-01-25 DIAGNOSIS — M21.961 ACQUIRED DEFORMITY OF RIGHT KNEE: ICD-10-CM

## 2024-01-25 DIAGNOSIS — M17.11 PRIMARY OSTEOARTHRITIS OF RIGHT KNEE: Primary | ICD-10-CM

## 2024-02-14 DIAGNOSIS — M21.961 ACQUIRED DEFORMITY OF RIGHT KNEE: ICD-10-CM

## 2024-02-14 DIAGNOSIS — M17.11 PRIMARY OSTEOARTHRITIS OF RIGHT KNEE: ICD-10-CM

## 2024-03-18 ENCOUNTER — TELEPHONE (OUTPATIENT)
Dept: ORTHOPEDIC SURGERY | Age: 74
End: 2024-03-18

## 2024-04-15 ENCOUNTER — PREP FOR PROCEDURE (OUTPATIENT)
Dept: ORTHOPEDIC SURGERY | Age: 74
End: 2024-04-15

## 2024-04-15 DIAGNOSIS — Z01.818 PRE-OP EVALUATION: Primary | ICD-10-CM

## 2024-04-15 RX ORDER — SODIUM CHLORIDE 0.9 % (FLUSH) 0.9 %
5-40 SYRINGE (ML) INJECTION PRN
Status: CANCELLED | OUTPATIENT
Start: 2024-04-15

## 2024-04-15 RX ORDER — SODIUM CHLORIDE 0.9 % (FLUSH) 0.9 %
5-40 SYRINGE (ML) INJECTION EVERY 12 HOURS SCHEDULED
Status: CANCELLED | OUTPATIENT
Start: 2024-04-15

## 2024-04-15 RX ORDER — SODIUM CHLORIDE 9 MG/ML
INJECTION, SOLUTION INTRAVENOUS PRN
Status: CANCELLED | OUTPATIENT
Start: 2024-04-15

## 2024-04-22 ENCOUNTER — HOSPITAL ENCOUNTER (OUTPATIENT)
Dept: REHABILITATION | Age: 74
Discharge: HOME | End: 2024-04-25
Payer: MEDICARE

## 2024-04-22 ENCOUNTER — HOSPITAL ENCOUNTER (OUTPATIENT)
Dept: SURGERY | Age: 74
Discharge: HOME | End: 2024-04-25
Payer: MEDICARE

## 2024-04-22 VITALS
WEIGHT: 141.31 LBS | BODY MASS INDEX: 26.68 KG/M2 | HEIGHT: 61 IN | HEART RATE: 78 BPM | DIASTOLIC BLOOD PRESSURE: 77 MMHG | TEMPERATURE: 98.1 F | SYSTOLIC BLOOD PRESSURE: 161 MMHG | RESPIRATION RATE: 16 BRPM | OXYGEN SATURATION: 95 %

## 2024-04-22 DIAGNOSIS — Z01.818 PRE-OP EVALUATION: ICD-10-CM

## 2024-04-22 DIAGNOSIS — Z96.651 STATUS POST RIGHT KNEE REPLACEMENT: Primary | ICD-10-CM

## 2024-04-22 DIAGNOSIS — R06.83 SNORING: ICD-10-CM

## 2024-04-22 LAB
ALBUMIN SERPL-MCNC: 3.6 G/DL (ref 3.2–4.6)
ANION GAP SERPL CALC-SCNC: 7 MMOL/L (ref 2–11)
BASOPHILS # BLD: 0 K/UL (ref 0–0.2)
BASOPHILS NFR BLD: 1 % (ref 0–2)
BUN SERPL-MCNC: 12 MG/DL (ref 8–23)
CALCIUM SERPL-MCNC: 9.6 MG/DL (ref 8.3–10.4)
CHLORIDE SERPL-SCNC: 106 MMOL/L (ref 103–113)
CO2 SERPL-SCNC: 28 MMOL/L (ref 21–32)
CREAT SERPL-MCNC: 0.59 MG/DL (ref 0.6–1)
DIFFERENTIAL METHOD BLD: ABNORMAL
EOSINOPHIL # BLD: 0.2 K/UL (ref 0–0.8)
EOSINOPHIL NFR BLD: 3 % (ref 0.5–7.8)
ERYTHROCYTE [DISTWIDTH] IN BLOOD BY AUTOMATED COUNT: 13.1 % (ref 11.9–14.6)
EST. AVERAGE GLUCOSE BLD GHB EST-MCNC: 117 MG/DL
GLUCOSE SERPL-MCNC: 100 MG/DL (ref 65–100)
HBA1C MFR BLD: 5.7 % (ref 4.8–5.6)
HCT VFR BLD AUTO: 39.9 % (ref 35.8–46.3)
HGB BLD-MCNC: 12.8 G/DL (ref 11.7–15.4)
IMM GRANULOCYTES # BLD AUTO: 0 K/UL (ref 0–0.5)
IMM GRANULOCYTES NFR BLD AUTO: 0 % (ref 0–5)
INR PPP: 1.1
LYMPHOCYTES # BLD: 1.6 K/UL (ref 0.5–4.6)
LYMPHOCYTES NFR BLD: 28 % (ref 13–44)
MCH RBC QN AUTO: 29 PG (ref 26.1–32.9)
MCHC RBC AUTO-ENTMCNC: 32.1 G/DL (ref 31.4–35)
MCV RBC AUTO: 90.3 FL (ref 82–102)
MONOCYTES # BLD: 0.5 K/UL (ref 0.1–1.3)
MONOCYTES NFR BLD: 8 % (ref 4–12)
MRSA DNA SPEC QL NAA+PROBE: NOT DETECTED
NEUTS SEG # BLD: 3.4 K/UL (ref 1.7–8.2)
NEUTS SEG NFR BLD: 60 % (ref 43–78)
NRBC # BLD: 0 K/UL (ref 0–0.2)
PLATELET # BLD AUTO: 374 K/UL (ref 150–450)
PMV BLD AUTO: 9.2 FL (ref 9.4–12.3)
POTASSIUM SERPL-SCNC: 4.1 MMOL/L (ref 3.5–5.1)
PROTHROMBIN TIME: 13.4 SEC (ref 11.3–14.9)
RBC # BLD AUTO: 4.42 M/UL (ref 4.05–5.2)
S AUREUS CPE NOSE QL NAA+PROBE: NOT DETECTED
SODIUM SERPL-SCNC: 141 MMOL/L (ref 136–146)
WBC # BLD AUTO: 5.7 K/UL (ref 4.3–11.1)

## 2024-04-22 PROCEDURE — 94760 N-INVAS EAR/PLS OXIMETRY 1: CPT

## 2024-04-22 PROCEDURE — 85025 COMPLETE CBC W/AUTO DIFF WBC: CPT

## 2024-04-22 PROCEDURE — 87641 MR-STAPH DNA AMP PROBE: CPT

## 2024-04-22 PROCEDURE — 83036 HEMOGLOBIN GLYCOSYLATED A1C: CPT

## 2024-04-22 PROCEDURE — 80307 DRUG TEST PRSMV CHEM ANLYZR: CPT

## 2024-04-22 PROCEDURE — 94664 DEMO&/EVAL PT USE INHALER: CPT

## 2024-04-22 PROCEDURE — 85610 PROTHROMBIN TIME: CPT

## 2024-04-22 PROCEDURE — 82040 ASSAY OF SERUM ALBUMIN: CPT

## 2024-04-22 PROCEDURE — 80048 BASIC METABOLIC PNL TOTAL CA: CPT

## 2024-04-22 PROCEDURE — 97161 PT EVAL LOW COMPLEX 20 MIN: CPT

## 2024-04-22 RX ORDER — THIAMINE MONONITRATE (VIT B1) 100 MG
250 TABLET ORAL DAILY
COMMUNITY

## 2024-04-22 RX ORDER — ASCORBIC ACID 500 MG
750 TABLET ORAL DAILY
COMMUNITY

## 2024-04-22 RX ORDER — TRIAMCINOLONE ACETONIDE 55 UG/1
2 SPRAY, METERED NASAL DAILY PRN
COMMUNITY

## 2024-04-22 RX ORDER — VITAMIN E 268 MG
400 CAPSULE ORAL DAILY
COMMUNITY

## 2024-04-22 RX ORDER — CETIRIZINE HYDROCHLORIDE 10 MG/1
10 TABLET ORAL DAILY PRN
COMMUNITY

## 2024-04-22 ASSESSMENT — KOOS JR
STANDING UPRIGHT: SEVERE
BENDING TO THE FLOOR TO PICK UP OBJECT: SEVERE
HOW SEVERE IS YOUR KNEE STIFFNESS AFTER FIRST WAKING IN MORNING: MODERATE
RISING FROM SITTING: SEVERE
TWISING OR PIVOTING ON KNEE: SEVERE
STRAIGHTENING KNEE FULLY: SEVERE
KOOS JR TOTAL INTERVAL SCORE: 36.931
GOING UP OR DOWN STAIRS: SEVERE

## 2024-04-22 ASSESSMENT — PULMONARY FUNCTION TESTS
FEV1 (%PREDICTED): 122
FEV1 (LITERS): 1.98

## 2024-04-22 ASSESSMENT — PROMIS GLOBAL HEALTH SCALE
SUM OF RESPONSES TO QUESTIONS 3, 6, 7, & 8: 17
IN THE PAST 7 DAYS, HOW WOULD YOU RATE YOUR PAIN ON AVERAGE [ON A SCALE FROM 0 (NO PAIN) TO 10 (WORST IMAGINABLE PAIN)]?: 7
IN GENERAL, HOW WOULD YOU RATE YOUR PHYSICAL HEALTH [ON A SCALE OF 1 (POOR) TO 5 (EXCELLENT)]?: VERY GOOD
SUM OF RESPONSES TO QUESTIONS 2, 4, 5, & 10: 15
IN THE PAST 7 DAYS, HOW OFTEN HAVE YOU BEEN BOTHERED BY EMOTIONAL PROBLEMS, SUCH AS FEELING ANXIOUS, DEPRESSED, OR IRRITABLE [ON A SCALE FROM 1 (NEVER) TO 5 (ALWAYS)]?: SOMETIMES
TO WHAT EXTENT ARE YOU ABLE TO CARRY OUT YOUR EVERYDAY PHYSICAL ACTIVITIES SUCH AS WALKING, CLIMBING STAIRS, CARRYING GROCERIES, OR MOVING A CHAIR [ON A SCALE OF 1 (NOT AT ALL) TO 5 (COMPLETELY)]?: MODERATELY
IN GENERAL, PLEASE RATE HOW WELL YOU CARRY OUT YOUR USUAL SOCIAL ACTIVITIES (INCLUDES ACTIVITIES AT HOME, AT WORK, AND IN YOUR COMMUNITY, AND RESPONSIBILITIES AS A PARENT, CHILD, SPOUSE, EMPLOYEE, FRIEND, ETC) [ON A SCALE OF 1 (POOR) TO 5 (EXCELLENT)]?: VERY GOOD
IN THE PAST 7 DAYS, HOW WOULD YOU RATE YOUR FATIGUE ON AVERAGE [ON A SCALE FROM 1 (NONE) TO 5 (VERY SEVERE)]?: MODERATE
HOW IS THE PROMIS V1.1 BEING ADMINISTERED?: PAPER
IN GENERAL, WOULD YOU SAY YOUR HEALTH IS...[ON A SCALE OF 1 (POOR) TO 5 (EXCELLENT)]: VERY GOOD
IN GENERAL, HOW WOULD YOU RATE YOUR MENTAL HEALTH, INCLUDING YOUR MOOD AND YOUR ABILITY TO THINK [ON A SCALE OF 1 (POOR) TO 5 (EXCELLENT)]?: VERY GOOD
IN GENERAL, WOULD YOU SAY YOUR QUALITY OF LIFE IS...[ON A SCALE OF 1 (POOR) TO 5 (EXCELLENT)]: VERY GOOD
WHO IS THE PERSON COMPLETING THE PROMIS V1.1 SURVEY?: SELF
IN GENERAL, HOW WOULD YOU RATE YOUR SATISFACTION WITH YOUR SOCIAL ACTIVITIES AND RELATIONSHIPS [ON A SCALE OF 1 (POOR) TO 5 (EXCELLENT)]?: VERY GOOD

## 2024-04-22 ASSESSMENT — PAIN DESCRIPTION - LOCATION: LOCATION: KNEE

## 2024-04-22 ASSESSMENT — PAIN DESCRIPTION - DESCRIPTORS: DESCRIPTORS: SHARP;STABBING

## 2024-04-22 NOTE — PERIOP NOTE
PLEASE CONTINUE TAKING ALL PRESCRIPTION MEDICATIONS UP TO THE DAY OF SURGERY UNLESS OTHERWISE DIRECTED BELOW.    DISCONTINUE all vitamins, herbals and supplements 3 weeks prior to surgery. DISCONTINUE Non-Steroidal Anti-Inflammatory (NSAIDS) such as Advil, Ibuprofen, Motrin, Naproxen and Aleve 5 days prior to surgery.       Home Medications to take  the day of surgery    Zyrtec if needed, levothyroxine, pantoprazole if needed, nasacort if needed           Home Medications to Hold- please continue all other medications except these.    Stop vitamins and supplements 3 weeks prior to surgery         Comments   On the day before surgery please take Acetaminophen 1000mg in the morning and then again before bed. You may substitute for Tylenol 650 mg.      Bring Dynahex wash and Incentive Spirometer with you to hospital on the day of surgery.            Please do not bring home medications with you on the day of surgery unless otherwise directed by your nurse.  If you are instructed to bring home medications, please give them to your nurse as they will be administered by the nursing staff.    If you have any questions, please call [facility] [phone] option [phone].    A copy of this note was provided to the patient for reference.

## 2024-04-22 NOTE — CARE COORDINATION
[facility] Case Management note:  Patient screened in Prehab for discharge planning needs. Patient scheduled for a future total joint replacement.  We discussed Home Health and equipment needed after surgery. List of Home Health providers offered.  Patient w/o preference towards provider.  Will arrange [facility] who covers [place]  Will need a Jr walker and 3-1 BSC.

## 2024-04-22 NOTE — PERIOP NOTE
Patient verified name and .    Order for consent found in EHR and matches case posting; patient verified.     Type 3 surgery, PAT Joint assessment complete.    Labs per surgeon: CBC,BMP, PT, albumin, HgbA1C, nicotine; results pending.   Labs per anesthesia protocol: no additional lab work needed.   EKG:Found in EHR dated 23 and within anesthesia guidelines.     MRSA/MSSA swab collected; pharmacy to review and dose antibiotic as appropriate.     Hospital approved surgical skin cleanser and instructions to return bottle on DOS given per hospital policy.    Patient provided with handouts including Guide to Surgery, Pain Management, Hand Hygiene, Blood Transfusion Education, and [place] [facility] Brochure.    Patient answered medical/surgical history questions at their best of ability. All prior to admission medications documented in [facility]. Original medication prescription bottle visualized during patient appointment.     Patient instructed to hold all vitamins 3 weeks prior to surgery and NSAIDS 5 days prior to surgery.     Patient teach back successful and patient demonstrates knowledge of instruction.

## 2024-04-22 NOTE — PROGRESS NOTES
04/22/24 0945   Treatment   Treatment Type Bedside spirometry   Breath Sounds   Breath Sounds Bilateral Clear   Oxygen Therapy/Pulse Ox   O2 Therapy Room air   Pulse 78   SpO2 95 %   Pulse Oximeter Device Mode Intermittent   $Pulse Oximeter $Spot check (single)   Bedside Spirometry   FEV-1/Actual (Liters) 1.98 Liters   FEV-1/Predicted (Liters) 122 Liters   RT Misc Charges   $RT Education $HHN/MDI/IPPB Demo or Eval  (spacer)     Pt's symptoms include:    Snoring  Tiredness- excessive daytime sleepiness  HTN  GERD  DELAYED AWAKENING  Neck size     35.5         cm  Modified [place] stage 4  SACS Score 6  STOP BANG 4  Height   5   '  1  \"   Weight  141   lbs  BMI 26.70    Sleepiness Scale:     Sitting and reading 2    Watching TV 2    Sitting inactive in a public place 1    As a passenger in a car for an hour without a break 2    Lying down to rest in the afternoon when circumstances Permits 1    Sitting and talking to someone 0    Sitting quietly after lunch without alcohol 2    In a car, while stopped for a few minutes 0    Total :  10    Refer patient for sleep study based on above assessment.  [facility]  Phone number:  [phone]  Initial respiratory Assessment completed with pt. Pt was interviewed and evaluated in [facility] prior to surgery.  Patient ID:  Renetta Cope  [id number]  73 y.o.  1950  Surgeon: Dr. Lawson  Date of Surgery: [unfilled]5/13/2024  Procedure: Total Right Knee Arthroplasty  Primary Care Physician: Jhony Muse -[phone]  Specialists:    Pt taught proper COUGH technique  IS REVIEWED WITH PT AS WELL AS BENEFITS OF USING IS IN SEDENTARY PTS.  DIAPHRAGMATIC BREATHING EXERCISE INSTRUCTIONS GIVEN    History of smoking:   DENIES                 Quit date:         Secondhand smoke:DENIES    Past procedures with Oxygen desaturation or delayed awakening:Delayed awakening     Respiratory history:DENIES SOB                                HX OF PNA  MILD ASTHMA  HX OF PERSISTENT

## 2024-04-22 NOTE — PROGRESS NOTES
Renetta Cope  : 1950  Primary: Medicare Part A And B  Secondary: [employer] MEDICARE SUPP [facility] [place] [address]  [phone]      Physical Therapy Prehab Evaluation Summary:2024   Time In/Out   PT Charge Capture  Episode     MEDICAL/REFERRING DIAGNOSIS: Unilateral primary osteoarthritis, right knee [M17.11]  REFERRING PHYSICIAN: Mars Lawson MD    Treatment Diagnosis:   Pain in Right Knee (M25.561)  Stiffness of Right Knee, Not elsewhere classified (M25.661)  Difficulty in walking, Not elsewhere classified (R26.2)    DATE OF SURGERY: 24  Assessment:   COMMENTS:  Ms. Cope is present for a Prehab Physical Therapy Assessment for their upcoming right TKA. They are here with  . After discussing the surgical admission options and discharge plans, they are planning on discharging on day of surgery.    Pt. Holding husbands hand with gait for support.    PROBLEM LIST:   (Impacting functional limitations):  Ms. Cope presents with the following lower extremity(s) problems:  Strength  Range of Motion  Home Exercise Program  Pain INTERVENTIONS PLANNED:   (Benefits and precautions of physical therapy have been discussed with the patient.)  Home Exercise Program  Educational Discussion       GOALS: (Goals have been discussed and agreed upon with patient.)  Discharge Goals: Time Frame: 1 Day  Patient will demonstrate independence with a home exercise program designed to increase strength, range of motion, and pain control to minimize functional deficits and optimize patient for total joint replacement.    Subjective:   Past Medical History/Comorbidities:   Ms. Cope  has a past medical history of Aortitis ([facility]), Arthritis, GERD (gastroesophageal reflux disease), Post-nasal drip, Prolonged emergence from general anesthesia, PVD (peripheral vascular disease) ([facility]), Thyroid disease, and Vertigo.  Ms. Cope  has a past surgical history

## 2024-04-22 NOTE — PROGRESS NOTES
Total Joint Surgery Preoperative Chart Review      Patient ID:  Renetta Cope  [id number]  73 y.o.  1950  Surgeon: Dr. Lawson  Date of Surgery: 5/13/2024  Procedure: Total Right Knee Arthroplasty  Primary Care Physician: Jhony Muse [phone]  Specialty Physician(s):      Subjective:   Renetta Cope is a 73 y.o. White (non-) female who presents for preoperative evaluation for Total Right Knee arthroplasty.    This is a preoperative chart review note based on data collected by the nurse at the surgical Pre-Assessment visit.    Past Medical History:   Diagnosis Date    Aortitis (HCC)     followed by vascular surgery    Arthritis     GERD (gastroesophageal reflux disease)     managed with meds prn    Post-nasal drip     Prolonged emergence from general anesthesia     PVD (peripheral vascular disease) (HCC)     Thyroid disease     Hypo    Vertigo       Past Surgical History:   Procedure Laterality Date    APPENDECTOMY      COLONOSCOPY      HYSTERECTOMY (CERVIX STATUS UNKNOWN)      ORTHOPEDIC SURGERY      toe surgery, both hands    UROLOGICAL SURGERY      bladder      Family History   Problem Relation Age of Onset    Heart Attack Father     Heart Attack Brother 45    Heart Attack Sister 40      Social History     Tobacco Use    Smoking status: Never    Smokeless tobacco: Never   Substance Use Topics    Alcohol use: Not Currently       Prior to Admission medications    Medication Sig Start Date End Date Taking? Authorizing Provider   Vitamin D3 125 MCG (5000 UT) TABS tablet Take 1 tablet by mouth daily   Yes Lazaro Sepulveda MD   vitamin E 400 UNIT capsule Take 1 capsule by mouth daily   Yes Lazaro Sepulveda MD   vitamin B-1 (THIAMINE) 100 MG tablet Take 2.5 tablets by mouth daily   Yes Lazaro Sepulveda MD   vitamin C (ASCORBIC ACID) 500 MG tablet Take 1.5 tablets by mouth daily   Yes Lazaro Sepulveda MD   Zinc 22.5 MG TABS Take 2 tablets by mouth daily   Yes Derick

## 2024-04-23 LAB
COMMENT:: NORMAL
COTININE UR QL SCN: NEGATIVE NG/ML

## 2024-04-24 NOTE — PROGRESS NOTES
NICOTINE AND METABOLITES, URINE  Order: [id number]  Status: Final result       Visible to patient: Yes (not seen)       Next appt: 01/03/2025 at 10:00 AM in Vascular Surgery ([facility] ULTRASOUND 1)       Dx: Pre-op evaluation    0 Result Notes       Component  Ref Range & Units 4/22/24 0924 Resulting Agency   Cotinine Screen, Ur  Mtpjst=038 ng/mL Negative [facility] RT[facility]   Comment:    Comment [facility]   Comment: (NOTE)  This analysis is performed by immunoassay. Positive findings are  unconfirmed analytical test results; if results do not support  expected clinical finding, confirmation by an alternate methodology  is recommended. Patient metabolic variables, specific drug chemistry,  and specimen characteristics can affect test outcome.  Technical consultation is available at "[email or web address], or  call toll free [phone].  Performed At: [facility]  [address], [place] [id number]  Reese Worley MD Ph:[phone]  Performed At: [facility] RT  [address] [place], [place] [id number]  Ulises Mukherjee PhD Ph:[phone]              Specimen Collected: 04/22/24 09:24 EDT Last Resulted: 04/23/24 16:36 EDT

## 2024-05-05 DIAGNOSIS — G89.18 POSTOPERATIVE PAIN: Primary | ICD-10-CM

## 2024-05-05 RX ORDER — GABAPENTIN 100 MG/1
100 CAPSULE ORAL 2 TIMES DAILY
Qty: 30 CAPSULE | Refills: 0 | Status: SHIPPED | OUTPATIENT
Start: 2024-05-05 | End: 2024-05-20

## 2024-05-05 RX ORDER — OXYCODONE HYDROCHLORIDE 5 MG/1
10 TABLET ORAL EVERY 4 HOURS PRN
Qty: 60 TABLET | Refills: 0 | Status: SHIPPED | OUTPATIENT
Start: 2024-05-05 | End: 2024-05-12

## 2024-05-05 RX ORDER — ACETAMINOPHEN 500 MG
1000 TABLET ORAL EVERY 6 HOURS PRN
Qty: 180 TABLET | Refills: 2 | Status: SHIPPED | OUTPATIENT
Start: 2024-05-05

## 2024-05-05 RX ORDER — SENNOSIDES A AND B 8.6 MG/1
1 TABLET, FILM COATED ORAL 2 TIMES DAILY
Qty: 30 TABLET | Refills: 2 | Status: SHIPPED | OUTPATIENT
Start: 2024-05-05

## 2024-05-05 RX ORDER — ONDANSETRON 4 MG/1
4 TABLET, FILM COATED ORAL 3 TIMES DAILY PRN
Qty: 30 TABLET | Refills: 1 | Status: SHIPPED | OUTPATIENT
Start: 2024-05-05

## 2024-05-05 RX ORDER — MELOXICAM 15 MG/1
15 TABLET ORAL DAILY
Qty: 30 TABLET | Refills: 2 | Status: SHIPPED | OUTPATIENT
Start: 2024-05-05

## 2024-05-05 RX ORDER — OMEPRAZOLE 40 MG/1
40 CAPSULE, DELAYED RELEASE ORAL DAILY
Qty: 30 CAPSULE | Refills: 0 | Status: SHIPPED | OUTPATIENT
Start: 2024-05-05

## 2024-05-05 RX ORDER — ASPIRIN 81 MG/1
81 TABLET ORAL 2 TIMES DAILY
Qty: 60 TABLET | Refills: 0 | Status: SHIPPED | OUTPATIENT
Start: 2024-05-05

## 2024-05-05 RX ORDER — TIZANIDINE 4 MG/1
4 TABLET ORAL EVERY 8 HOURS PRN
Qty: 40 TABLET | Refills: 0 | Status: SHIPPED | OUTPATIENT
Start: 2024-05-05

## 2024-05-12 ENCOUNTER — ANESTHESIA EVENT (OUTPATIENT)
Dept: SURGERY | Age: 74
End: 2024-05-12
Payer: MEDICARE

## 2024-05-13 ENCOUNTER — ANESTHESIA (OUTPATIENT)
Dept: SURGERY | Age: 74
End: 2024-05-13
Payer: MEDICARE

## 2024-05-13 ENCOUNTER — HOSPITAL ENCOUNTER (OUTPATIENT)
Age: 74
Discharge: HOME HEALTH | End: 2024-05-13
Attending: ORTHOPAEDIC SURGERY | Admitting: ORTHOPAEDIC SURGERY
Payer: MEDICARE

## 2024-05-13 VITALS
TEMPERATURE: 98 F | DIASTOLIC BLOOD PRESSURE: 72 MMHG | RESPIRATION RATE: 14 BRPM | SYSTOLIC BLOOD PRESSURE: 126 MMHG | OXYGEN SATURATION: 97 % | WEIGHT: 140 LBS | HEIGHT: 61 IN | HEART RATE: 83 BPM | BODY MASS INDEX: 26.43 KG/M2

## 2024-05-13 PROBLEM — M17.11 OSTEOARTHRITIS OF RIGHT KNEE, UNSPECIFIED OSTEOARTHRITIS TYPE: Status: ACTIVE | Noted: 2024-05-13

## 2024-05-13 PROCEDURE — 94760 N-INVAS EAR/PLS OXIMETRY 1: CPT

## 2024-05-13 PROCEDURE — 6360000002 HC RX W HCPCS: Performed by: NURSE ANESTHETIST, CERTIFIED REGISTERED

## 2024-05-13 PROCEDURE — 3600000005 HC SURGERY LEVEL 5 BASE: Performed by: ORTHOPAEDIC SURGERY

## 2024-05-13 PROCEDURE — 97161 PT EVAL LOW COMPLEX 20 MIN: CPT

## 2024-05-13 PROCEDURE — 97535 SELF CARE MNGMENT TRAINING: CPT

## 2024-05-13 PROCEDURE — 97165 OT EVAL LOW COMPLEX 30 MIN: CPT

## 2024-05-13 PROCEDURE — 6360000002 HC RX W HCPCS: Performed by: ORTHOPAEDIC SURGERY

## 2024-05-13 PROCEDURE — 6360000002 HC RX W HCPCS: Performed by: ANESTHESIOLOGY

## 2024-05-13 PROCEDURE — 7100000001 HC PACU RECOVERY - ADDTL 15 MIN: Performed by: ORTHOPAEDIC SURGERY

## 2024-05-13 PROCEDURE — 3700000000 HC ANESTHESIA ATTENDED CARE: Performed by: ORTHOPAEDIC SURGERY

## 2024-05-13 PROCEDURE — 2580000003 HC RX 258: Performed by: ANESTHESIOLOGY

## 2024-05-13 PROCEDURE — 2500000003 HC RX 250 WO HCPCS: Performed by: NURSE ANESTHETIST, CERTIFIED REGISTERED

## 2024-05-13 PROCEDURE — C1713 ANCHOR/SCREW BN/BN,TIS/BN: HCPCS | Performed by: ORTHOPAEDIC SURGERY

## 2024-05-13 PROCEDURE — 3700000001 HC ADD 15 MINUTES (ANESTHESIA): Performed by: ORTHOPAEDIC SURGERY

## 2024-05-13 PROCEDURE — 64447 NJX AA&/STRD FEMORAL NRV IMG: CPT | Performed by: ANESTHESIOLOGY

## 2024-05-13 PROCEDURE — 6370000000 HC RX 637 (ALT 250 FOR IP): Performed by: NURSE PRACTITIONER

## 2024-05-13 PROCEDURE — C1776 JOINT DEVICE (IMPLANTABLE): HCPCS | Performed by: ORTHOPAEDIC SURGERY

## 2024-05-13 PROCEDURE — 97530 THERAPEUTIC ACTIVITIES: CPT

## 2024-05-13 PROCEDURE — 20985 CPTR-ASST DIR MS PX: CPT | Performed by: ORTHOPAEDIC SURGERY

## 2024-05-13 PROCEDURE — 6360000002 HC RX W HCPCS: Performed by: NURSE PRACTITIONER

## 2024-05-13 PROCEDURE — 6370000000 HC RX 637 (ALT 250 FOR IP): Performed by: ANESTHESIOLOGY

## 2024-05-13 PROCEDURE — 2709999900 HC NON-CHARGEABLE SUPPLY: Performed by: ORTHOPAEDIC SURGERY

## 2024-05-13 PROCEDURE — 7100000000 HC PACU RECOVERY - FIRST 15 MIN: Performed by: ORTHOPAEDIC SURGERY

## 2024-05-13 PROCEDURE — 2720000010 HC SURG SUPPLY STERILE: Performed by: ORTHOPAEDIC SURGERY

## 2024-05-13 PROCEDURE — 27447 TOTAL KNEE ARTHROPLASTY: CPT | Performed by: ORTHOPAEDIC SURGERY

## 2024-05-13 PROCEDURE — 3600000015 HC SURGERY LEVEL 5 ADDTL 15MIN: Performed by: ORTHOPAEDIC SURGERY

## 2024-05-13 DEVICE — COMPONENT TOT KNEE CAPPED PRIMARY K2STRYKER] STRYKER CORP]: Type: IMPLANTABLE DEVICE | Status: FUNCTIONAL

## 2024-05-13 DEVICE — CRUCIATE RETAINING FEMORAL
Type: IMPLANTABLE DEVICE | Site: KNEE | Status: FUNCTIONAL
Brand: TRIATHLON

## 2024-05-13 DEVICE — TIBIAL COMPONENT
Type: IMPLANTABLE DEVICE | Site: KNEE | Status: FUNCTIONAL
Brand: TRIATHLON

## 2024-05-13 DEVICE — PATELLA
Type: IMPLANTABLE DEVICE | Site: KNEE | Status: FUNCTIONAL
Brand: TRIATHLON

## 2024-05-13 DEVICE — TIBIAL BEARING INSERT - CS
Type: IMPLANTABLE DEVICE | Site: KNEE | Status: FUNCTIONAL
Brand: TRIATHLON

## 2024-05-13 DEVICE — POWDER SURG CELLERATE RX 1 GM HYDROL COLLEGEN: Type: IMPLANTABLE DEVICE | Site: KNEE | Status: FUNCTIONAL

## 2024-05-13 RX ORDER — CETIRIZINE HYDROCHLORIDE 10 MG/1
10 TABLET ORAL DAILY PRN
Status: DISCONTINUED | OUTPATIENT
Start: 2024-05-13 | End: 2024-05-13 | Stop reason: HOSPADM

## 2024-05-13 RX ORDER — SODIUM CHLORIDE 9 MG/ML
INJECTION, SOLUTION INTRAVENOUS CONTINUOUS
Status: DISCONTINUED | OUTPATIENT
Start: 2024-05-13 | End: 2024-05-13 | Stop reason: HOSPADM

## 2024-05-13 RX ORDER — SODIUM CHLORIDE 0.9 % (FLUSH) 0.9 %
5-40 SYRINGE (ML) INJECTION PRN
Status: DISCONTINUED | OUTPATIENT
Start: 2024-05-13 | End: 2024-05-13 | Stop reason: HOSPADM

## 2024-05-13 RX ORDER — DIPHENHYDRAMINE HCL 25 MG
25 CAPSULE ORAL EVERY 6 HOURS PRN
Status: DISCONTINUED | OUTPATIENT
Start: 2024-05-13 | End: 2024-05-13 | Stop reason: HOSPADM

## 2024-05-13 RX ORDER — LIDOCAINE HYDROCHLORIDE 10 MG/ML
1 INJECTION, SOLUTION INFILTRATION; PERINEURAL
Status: DISCONTINUED | OUTPATIENT
Start: 2024-05-13 | End: 2024-05-13 | Stop reason: HOSPADM

## 2024-05-13 RX ORDER — ACETAMINOPHEN 500 MG
1000 TABLET ORAL ONCE
Status: COMPLETED | OUTPATIENT
Start: 2024-05-13 | End: 2024-05-13

## 2024-05-13 RX ORDER — GABAPENTIN 100 MG/1
100 CAPSULE ORAL 2 TIMES DAILY
Status: DISCONTINUED | OUTPATIENT
Start: 2024-05-13 | End: 2024-05-13 | Stop reason: HOSPADM

## 2024-05-13 RX ORDER — DEXAMETHASONE SODIUM PHOSPHATE 4 MG/ML
INJECTION, SOLUTION INTRA-ARTICULAR; INTRALESIONAL; INTRAMUSCULAR; INTRAVENOUS; SOFT TISSUE
Status: DISCONTINUED | OUTPATIENT
Start: 2024-05-13 | End: 2024-05-13 | Stop reason: SDUPTHER

## 2024-05-13 RX ORDER — NALOXONE HYDROCHLORIDE 0.4 MG/ML
INJECTION, SOLUTION INTRAMUSCULAR; INTRAVENOUS; SUBCUTANEOUS PRN
Status: DISCONTINUED | OUTPATIENT
Start: 2024-05-13 | End: 2024-05-13 | Stop reason: HOSPADM

## 2024-05-13 RX ORDER — SENNA AND DOCUSATE SODIUM 50; 8.6 MG/1; MG/1
1 TABLET, FILM COATED ORAL 2 TIMES DAILY
Status: DISCONTINUED | OUTPATIENT
Start: 2024-05-13 | End: 2024-05-13 | Stop reason: HOSPADM

## 2024-05-13 RX ORDER — SODIUM CHLORIDE 0.9 % (FLUSH) 0.9 %
5-40 SYRINGE (ML) INJECTION EVERY 12 HOURS SCHEDULED
Status: DISCONTINUED | OUTPATIENT
Start: 2024-05-13 | End: 2024-05-13 | Stop reason: HOSPADM

## 2024-05-13 RX ORDER — KETOROLAC TROMETHAMINE 30 MG/ML
INJECTION, SOLUTION INTRAMUSCULAR; INTRAVENOUS PRN
Status: DISCONTINUED | OUTPATIENT
Start: 2024-05-13 | End: 2024-05-13 | Stop reason: HOSPADM

## 2024-05-13 RX ORDER — ROPIVACAINE HYDROCHLORIDE 2 MG/ML
INJECTION, SOLUTION EPIDURAL; INFILTRATION; PERINEURAL
Status: DISCONTINUED | OUTPATIENT
Start: 2024-05-13 | End: 2024-05-13 | Stop reason: SDUPTHER

## 2024-05-13 RX ORDER — OXYCODONE HYDROCHLORIDE 5 MG/1
5 TABLET ORAL EVERY 4 HOURS PRN
Status: DISCONTINUED | OUTPATIENT
Start: 2024-05-13 | End: 2024-05-13 | Stop reason: HOSPADM

## 2024-05-13 RX ORDER — ACETAMINOPHEN 500 MG
1000 TABLET ORAL EVERY 6 HOURS
Status: DISCONTINUED | OUTPATIENT
Start: 2024-05-13 | End: 2024-05-13 | Stop reason: HOSPADM

## 2024-05-13 RX ORDER — VANCOMYCIN HYDROCHLORIDE 1 G/20ML
INJECTION, POWDER, LYOPHILIZED, FOR SOLUTION INTRAVENOUS PRN
Status: DISCONTINUED | OUTPATIENT
Start: 2024-05-13 | End: 2024-05-13 | Stop reason: HOSPADM

## 2024-05-13 RX ORDER — IPRATROPIUM BROMIDE AND ALBUTEROL SULFATE 2.5; .5 MG/3ML; MG/3ML
1 SOLUTION RESPIRATORY (INHALATION)
Status: DISCONTINUED | OUTPATIENT
Start: 2024-05-13 | End: 2024-05-13 | Stop reason: HOSPADM

## 2024-05-13 RX ORDER — ASPIRIN 81 MG/1
81 TABLET ORAL 2 TIMES DAILY
Status: DISCONTINUED | OUTPATIENT
Start: 2024-05-13 | End: 2024-05-13 | Stop reason: HOSPADM

## 2024-05-13 RX ORDER — ONDANSETRON 2 MG/ML
4 INJECTION INTRAMUSCULAR; INTRAVENOUS EVERY 6 HOURS PRN
Status: DISCONTINUED | OUTPATIENT
Start: 2024-05-13 | End: 2024-05-13 | Stop reason: HOSPADM

## 2024-05-13 RX ORDER — OXYCODONE HCL 10 MG/1
10 TABLET, FILM COATED, EXTENDED RELEASE ORAL EVERY 12 HOURS SCHEDULED
Status: DISCONTINUED | OUTPATIENT
Start: 2024-05-13 | End: 2024-05-13 | Stop reason: HOSPADM

## 2024-05-13 RX ORDER — NALOXONE HYDROCHLORIDE 0.4 MG/ML
0.4 INJECTION, SOLUTION INTRAMUSCULAR; INTRAVENOUS; SUBCUTANEOUS PRN
Status: DISCONTINUED | OUTPATIENT
Start: 2024-05-13 | End: 2024-05-13 | Stop reason: HOSPADM

## 2024-05-13 RX ORDER — SODIUM CHLORIDE, SODIUM LACTATE, POTASSIUM CHLORIDE, CALCIUM CHLORIDE 600; 310; 30; 20 MG/100ML; MG/100ML; MG/100ML; MG/100ML
INJECTION, SOLUTION INTRAVENOUS CONTINUOUS
Status: DISCONTINUED | OUTPATIENT
Start: 2024-05-13 | End: 2024-05-13 | Stop reason: HOSPADM

## 2024-05-13 RX ORDER — LEVOTHYROXINE SODIUM 0.1 MG/1
100 TABLET ORAL
Status: DISCONTINUED | OUTPATIENT
Start: 2024-05-14 | End: 2024-05-13 | Stop reason: HOSPADM

## 2024-05-13 RX ORDER — MIDAZOLAM HYDROCHLORIDE 2 MG/2ML
2 INJECTION, SOLUTION INTRAMUSCULAR; INTRAVENOUS
Status: COMPLETED | OUTPATIENT
Start: 2024-05-13 | End: 2024-05-13

## 2024-05-13 RX ORDER — FENTANYL CITRATE 50 UG/ML
50 INJECTION, SOLUTION INTRAMUSCULAR; INTRAVENOUS EVERY 5 MIN PRN
Status: DISCONTINUED | OUTPATIENT
Start: 2024-05-13 | End: 2024-05-13 | Stop reason: HOSPADM

## 2024-05-13 RX ORDER — TIZANIDINE 2 MG/1
4 TABLET ORAL 3 TIMES DAILY
Status: DISCONTINUED | OUTPATIENT
Start: 2024-05-13 | End: 2024-05-13 | Stop reason: HOSPADM

## 2024-05-13 RX ORDER — HALOPERIDOL 5 MG/ML
1 INJECTION INTRAMUSCULAR
Status: DISCONTINUED | OUTPATIENT
Start: 2024-05-13 | End: 2024-05-13 | Stop reason: HOSPADM

## 2024-05-13 RX ORDER — TRANEXAMIC ACID 100 MG/ML
INJECTION, SOLUTION INTRAVENOUS PRN
Status: DISCONTINUED | OUTPATIENT
Start: 2024-05-13 | End: 2024-05-13 | Stop reason: SDUPTHER

## 2024-05-13 RX ORDER — ROPIVACAINE HYDROCHLORIDE 2 MG/ML
INJECTION, SOLUTION EPIDURAL; INFILTRATION; PERINEURAL PRN
Status: DISCONTINUED | OUTPATIENT
Start: 2024-05-13 | End: 2024-05-13 | Stop reason: HOSPADM

## 2024-05-13 RX ORDER — PANTOPRAZOLE SODIUM 40 MG/1
40 TABLET, DELAYED RELEASE ORAL
Status: DISCONTINUED | OUTPATIENT
Start: 2024-05-14 | End: 2024-05-13 | Stop reason: HOSPADM

## 2024-05-13 RX ORDER — SODIUM CHLORIDE 9 MG/ML
INJECTION, SOLUTION INTRAVENOUS PRN
Status: DISCONTINUED | OUTPATIENT
Start: 2024-05-13 | End: 2024-05-13 | Stop reason: HOSPADM

## 2024-05-13 RX ORDER — ALBUTEROL SULFATE 2.5 MG/3ML
2.5 SOLUTION RESPIRATORY (INHALATION) EVERY 6 HOURS PRN
Status: DISCONTINUED | OUTPATIENT
Start: 2024-05-13 | End: 2024-05-13 | Stop reason: HOSPADM

## 2024-05-13 RX ORDER — DEXAMETHASONE SODIUM PHOSPHATE 10 MG/ML
10 INJECTION INTRAMUSCULAR; INTRAVENOUS ONCE
Status: DISCONTINUED | OUTPATIENT
Start: 2024-05-14 | End: 2024-05-13 | Stop reason: HOSPADM

## 2024-05-13 RX ORDER — LIDOCAINE HYDROCHLORIDE 20 MG/ML
INJECTION, SOLUTION EPIDURAL; INFILTRATION; INTRACAUDAL; PERINEURAL PRN
Status: DISCONTINUED | OUTPATIENT
Start: 2024-05-13 | End: 2024-05-13 | Stop reason: SDUPTHER

## 2024-05-13 RX ORDER — DIPHENHYDRAMINE HYDROCHLORIDE 50 MG/ML
25 INJECTION INTRAMUSCULAR; INTRAVENOUS EVERY 6 HOURS PRN
Status: DISCONTINUED | OUTPATIENT
Start: 2024-05-13 | End: 2024-05-13 | Stop reason: HOSPADM

## 2024-05-13 RX ORDER — ONDANSETRON 4 MG/1
8 TABLET, ORALLY DISINTEGRATING ORAL EVERY 8 HOURS PRN
Status: DISCONTINUED | OUTPATIENT
Start: 2024-05-13 | End: 2024-05-13

## 2024-05-13 RX ORDER — ONDANSETRON 2 MG/ML
4 INJECTION INTRAMUSCULAR; INTRAVENOUS
Status: DISCONTINUED | OUTPATIENT
Start: 2024-05-13 | End: 2024-05-13 | Stop reason: HOSPADM

## 2024-05-13 RX ORDER — FLUTICASONE PROPIONATE 50 MCG
2 SPRAY, SUSPENSION (ML) NASAL DAILY
Status: DISCONTINUED | OUTPATIENT
Start: 2024-05-14 | End: 2024-05-13 | Stop reason: HOSPADM

## 2024-05-13 RX ORDER — OXYCODONE HYDROCHLORIDE 5 MG/1
5 TABLET ORAL
Status: DISCONTINUED | OUTPATIENT
Start: 2024-05-13 | End: 2024-05-13 | Stop reason: HOSPADM

## 2024-05-13 RX ORDER — MELOXICAM 7.5 MG/1
7.5 TABLET ORAL 2 TIMES DAILY
Status: DISCONTINUED | OUTPATIENT
Start: 2024-05-17 | End: 2024-05-13 | Stop reason: HOSPADM

## 2024-05-13 RX ORDER — EPHEDRINE SULFATE 5 MG/ML
INJECTION INTRAVENOUS PRN
Status: DISCONTINUED | OUTPATIENT
Start: 2024-05-13 | End: 2024-05-13 | Stop reason: SDUPTHER

## 2024-05-13 RX ORDER — TRANEXAMIC ACID 650 MG/1
1300 TABLET ORAL
Status: DISCONTINUED | OUTPATIENT
Start: 2024-05-13 | End: 2024-05-13 | Stop reason: HOSPADM

## 2024-05-13 RX ORDER — ONDANSETRON 4 MG/1
4 TABLET, ORALLY DISINTEGRATING ORAL EVERY 8 HOURS PRN
Status: DISCONTINUED | OUTPATIENT
Start: 2024-05-13 | End: 2024-05-13 | Stop reason: HOSPADM

## 2024-05-13 RX ORDER — TOBRAMYCIN 1.2 G/30ML
INJECTION, POWDER, LYOPHILIZED, FOR SOLUTION INTRAVENOUS PRN
Status: DISCONTINUED | OUTPATIENT
Start: 2024-05-13 | End: 2024-05-13 | Stop reason: HOSPADM

## 2024-05-13 RX ORDER — KETOROLAC TROMETHAMINE 15 MG/ML
15 INJECTION, SOLUTION INTRAMUSCULAR; INTRAVENOUS EVERY 8 HOURS
Status: DISCONTINUED | OUTPATIENT
Start: 2024-05-13 | End: 2024-05-13 | Stop reason: HOSPADM

## 2024-05-13 RX ORDER — OXYCODONE HYDROCHLORIDE 5 MG/1
10 TABLET ORAL EVERY 4 HOURS PRN
Status: DISCONTINUED | OUTPATIENT
Start: 2024-05-13 | End: 2024-05-13 | Stop reason: HOSPADM

## 2024-05-13 RX ORDER — PROPOFOL 10 MG/ML
INJECTION, EMULSION INTRAVENOUS PRN
Status: DISCONTINUED | OUTPATIENT
Start: 2024-05-13 | End: 2024-05-13 | Stop reason: SDUPTHER

## 2024-05-13 RX ADMIN — LIDOCAINE HYDROCHLORIDE 40 MG: 20 INJECTION, SOLUTION EPIDURAL; INFILTRATION; INTRACAUDAL; PERINEURAL at 08:05

## 2024-05-13 RX ADMIN — HYDROMORPHONE HYDROCHLORIDE 0.5 MG: 0.5 INJECTION, SOLUTION INTRAMUSCULAR; INTRAVENOUS; SUBCUTANEOUS at 10:18

## 2024-05-13 RX ADMIN — MIDAZOLAM 2 MG: 1 INJECTION INTRAMUSCULAR; INTRAVENOUS at 07:32

## 2024-05-13 RX ADMIN — ACETAMINOPHEN 1000 MG: 500 TABLET ORAL at 12:30

## 2024-05-13 RX ADMIN — HYDROMORPHONE HYDROCHLORIDE 0.5 MG: 0.5 INJECTION, SOLUTION INTRAMUSCULAR; INTRAVENOUS; SUBCUTANEOUS at 10:30

## 2024-05-13 RX ADMIN — Medication 2000 MG: at 08:04

## 2024-05-13 RX ADMIN — PROPOFOL 120 MCG/KG/MIN: 10 INJECTION, EMULSION INTRAVENOUS at 08:06

## 2024-05-13 RX ADMIN — TRANEXAMIC ACID 1000 MG: 100 INJECTION, SOLUTION INTRAVENOUS at 08:04

## 2024-05-13 RX ADMIN — PHENYLEPHRINE HYDROCHLORIDE 100 MCG: 0.1 INJECTION, SOLUTION INTRAVENOUS at 08:50

## 2024-05-13 RX ADMIN — EPHEDRINE SULFATE 15 MG: 5 INJECTION INTRAVENOUS at 08:31

## 2024-05-13 RX ADMIN — TRANEXAMIC ACID 1300 MG: 650 TABLET ORAL at 12:30

## 2024-05-13 RX ADMIN — ROPIVACAINE HYDROCHLORIDE 20 ML: 2 INJECTION, SOLUTION EPIDURAL; INFILTRATION at 07:31

## 2024-05-13 RX ADMIN — HYDROMORPHONE HYDROCHLORIDE 0.5 MG: 0.5 INJECTION, SOLUTION INTRAMUSCULAR; INTRAVENOUS; SUBCUTANEOUS at 10:50

## 2024-05-13 RX ADMIN — HYDROMORPHONE HYDROCHLORIDE 0.5 MG: 0.5 INJECTION, SOLUTION INTRAMUSCULAR; INTRAVENOUS; SUBCUTANEOUS at 10:40

## 2024-05-13 RX ADMIN — ACETAMINOPHEN 1000 MG: 500 TABLET, FILM COATED ORAL at 06:37

## 2024-05-13 RX ADMIN — MEPIVACAINE HYDROCHLORIDE 60 MG: 20 INJECTION, SOLUTION EPIDURAL; INFILTRATION at 07:58

## 2024-05-13 RX ADMIN — DEXAMETHASONE SODIUM PHOSPHATE 4 MG: 4 INJECTION, SOLUTION INTRAMUSCULAR; INTRAVENOUS at 07:31

## 2024-05-13 RX ADMIN — PROPOFOL 50 MG: 10 INJECTION, EMULSION INTRAVENOUS at 08:05

## 2024-05-13 RX ADMIN — SODIUM CHLORIDE, POTASSIUM CHLORIDE, SODIUM LACTATE AND CALCIUM CHLORIDE: 600; 310; 30; 20 INJECTION, SOLUTION INTRAVENOUS at 06:44

## 2024-05-13 RX ADMIN — PHENYLEPHRINE HYDROCHLORIDE 5 MCG: 0.1 INJECTION, SOLUTION INTRAVENOUS at 08:59

## 2024-05-13 ASSESSMENT — PAIN SCALES - GENERAL
PAINLEVEL_OUTOF10: 0
PAINLEVEL_OUTOF10: 0
PAINLEVEL_OUTOF10: 5
PAINLEVEL_OUTOF10: 2
PAINLEVEL_OUTOF10: 9
PAINLEVEL_OUTOF10: 3
PAINLEVEL_OUTOF10: 7
PAINLEVEL_OUTOF10: 8
PAINLEVEL_OUTOF10: 8

## 2024-05-13 ASSESSMENT — KOOS JR
STRAIGHTENING KNEE FULLY: MILD
TWISING OR PIVOTING ON KNEE: MILD
STANDING UPRIGHT: MILD
RISING FROM SITTING: MILD
HOW SEVERE IS YOUR KNEE STIFFNESS AFTER FIRST WAKING IN MORNING: MILD
BENDING TO THE FLOOR TO PICK UP OBJECT: MILD
GOING UP OR DOWN STAIRS: MILD
KOOS JR TOTAL INTERVAL SCORE: 68.284

## 2024-05-13 ASSESSMENT — PAIN DESCRIPTION - ORIENTATION
ORIENTATION: RIGHT

## 2024-05-13 ASSESSMENT — PAIN - FUNCTIONAL ASSESSMENT
PAIN_FUNCTIONAL_ASSESSMENT: 0-10
PAIN_FUNCTIONAL_ASSESSMENT: PREVENTS OR INTERFERES SOME ACTIVE ACTIVITIES AND ADLS

## 2024-05-13 ASSESSMENT — PAIN DESCRIPTION - LOCATION
LOCATION: KNEE

## 2024-05-13 ASSESSMENT — PAIN DESCRIPTION - PAIN TYPE: TYPE: ACUTE PAIN;SURGICAL PAIN

## 2024-05-13 NOTE — PERIOP NOTE
Changed patient gown due to copious oral secretions from coughing.  Patient states she has seen ENT and 2 pulmonologists in the past and denies any hx of lung disease or smoking.

## 2024-05-13 NOTE — ANESTHESIA PRE PROCEDURE
09:21 AM    MCV 90.3 04/22/2024 09:21 AM    RDW 13.1 04/22/2024 09:21 AM     04/22/2024 09:21 AM       CMP:   Lab Results   Component Value Date/Time     04/22/2024 09:21 AM    K 4.1 04/22/2024 09:21 AM     04/22/2024 09:21 AM    CO2 28 04/22/2024 09:21 AM    BUN 12 04/22/2024 09:21 AM    CREATININE 0.59 04/22/2024 09:21 AM    GFRAA >60 04/16/2020 06:46 AM    LABGLOM >90 04/22/2024 09:21 AM    GLUCOSE 100 04/22/2024 09:21 AM    CALCIUM 9.6 04/22/2024 09:21 AM    BILITOT 0.4 05/14/2023 11:03 AM    ALKPHOS 86 05/14/2023 11:03 AM    AST 13 05/14/2023 11:03 AM    ALT 24 05/14/2023 11:03 AM       POC Tests: No results for input(s): \"POCGLU\", \"POCNA\", \"POCK\", \"POCCL\", \"POCBUN\", \"POCHEMO\", \"POCHCT\" in the last 72 hours.    Coags:   Lab Results   Component Value Date/Time    PROTIME 13.4 04/22/2024 09:21 AM    INR 1.1 04/22/2024 09:21 AM    APTT 31.2 04/16/2020 06:46 AM       HCG (If Applicable): No results found for: \"PREGTESTUR\", \"PREGSERUM\", \"HCG\", \"HCGQUANT\"     ABGs: No results found for: \"PHART\", \"PO2ART\", \"BUV2HLB\", \"NKV2DXE\", \"BEART\", \"O5WJGCHT\"     Type & Screen (If Applicable):  No results found for: \"LABABO\"    Drug/Infectious Status (If Applicable):  No results found for: \"HIV\", \"HEPCAB\"    COVID-19 Screening (If Applicable): No results found for: \"COVID19\"        Anesthesia Evaluation  Patient summary reviewed  Airway: Mallampati: II          Dental: normal exam         Pulmonary:Negative Pulmonary ROS breath sounds clear to auscultation  (+)           asthma:                            Cardiovascular:  Exercise tolerance: good (>4 METS)      (-) past MI, murmur and peripheral edema      Rhythm: regular  Rate: normal                    Neuro/Psych:   Negative Neuro/Psych ROS     (-) CVA           GI/Hepatic/Renal: Neg GI/Hepatic/Renal ROS  (+) GERD:          Endo/Other: Negative Endo/Other ROS   (+) hypothyroidism::..                 Abdominal:             Vascular: negative vascular ROS.  +

## 2024-05-13 NOTE — PROGRESS NOTES
OCCUPATIONAL THERAPY Initial Assessment and Daily Note      (Link to Caseload Tracking: OT Visit Days: 1  OT Orders   Time  OT Charge Capture  Rehab Caseload Tracker  Episode     Renetta Cope is a 73 y.o. female   PRIMARY DIAGNOSIS: Osteoarthritis of right knee, unspecified osteoarthritis type  Primary osteoarthritis of right knee [M17.11]  Acquired deformity of right knee [M21.961]  Osteoarthritis of right knee, unspecified osteoarthritis type [M17.11]  Procedure(s) (LRB):  Right KNEE TOTAL ARTHROPLASTY ROBOTIC, [place]/SATISH/ SDD (Right)  Day of Surgery  Reason for Referral: Pain in Right Knee (M25.561)  Stiffness of Right Knee, Not elsewhere classified (M25.661)  Outpatient in a bed: Payor: MEDICARE / Plan: MEDICARE PART A AND B / Product Type: *No Product type* /     ASSESSMENT:     REHAB RECOMMENDATIONS:   Recommendation to date pending progress:  Setting:  No further skilled occupational therapy after discharge from hospital    Equipment:    None     ASSESSMENT:  Ms. Cope is s/p right TKA and presents with decreased independence with functional mobility and activities of daily living as compared to baseline level of function and safety. Patient would benefit from skilled Occupational Therapy to maximize independence and safety with self-care task and functional mobility.   Patient able to complete  lower body dressing, upper body dressing, toileting, and grooming at bathroom/recliner with minimal assist. OT educated patient and family  on walker management and safety, safety with functional transfers, lower body dressing compensatory strategies and wound vac/recovery process. Mobilized from hospital bed to bathroom to recliner using a rolling walker with assist. Patient is hopeful to return home day of surgery.       [facility] AM-PAC™ “6 Clicks” Daily Activity Inpatient Short Form:     AM-PAC Daily Activity - Inpatient   How much help is needed for putting on and taking off regular lower body

## 2024-05-13 NOTE — PERIOP NOTE
TRANSFER - OUT REPORT:    Verbal report given to Rolanda Youssef RN on Renetta Cope  being transferred to Room 337 for routine progression of patient care       Report consisted of patient's Situation, Background, Assessment and   Recommendations(SBAR).     Information from the following report(s) Nurse Handoff Report, Surgery Report, Intake/Output, MAR, Recent Results, and Cardiac Rhythm SR  was reviewed with the receiving nurse.           Lines:   Peripheral IV 05/13/24 Left;Posterior Hand (Active)   Site Assessment Clean, dry & intact 05/13/24 1006   Line Status Infusing 05/13/24 1006   Line Care Connections checked and tightened 05/13/24 1006   Phlebitis Assessment No symptoms 05/13/24 1006   Infiltration Assessment 0 05/13/24 1006   Alcohol Cap Used No 05/13/24 1006   Dressing Status Clean, dry & intact 05/13/24 1006   Dressing Type Transparent 05/13/24 1006        Opportunity for questions and clarification was provided.      Patient transported with:  O2 @ 1-2lpm

## 2024-05-13 NOTE — OP NOTE
[facility]  Total Knee Arthroplasty  Patient:Renetta Cope   : 1950  Medical Record Number:[id number]    Pre-operative Diagnosis: Primary osteoarthritis of right knee [M17.11]  Acquired deformity of right knee [M21.961]    Post-operative Diagnosis: Same    Location: [facility]    Date of Procedure: 2024    Surgeon: Mars Lawson MD    Assistant: Jennifer Root CFA    Anesthesia: Spinal + adductor nerve block    Procedure:  Imageless Computer-Navigated Right Total Knee Arthroplasty    Tourniquet Time: Tourniquet Not Used    BMI: Body mass index is 26.47 kg/m².    EBL: 200cc    Complications: None    Patient Condition upon Completion of Procedure: Stable    Implants:   Implant Name Type Inv. Item Serial No.  Lot No. LRB No. Used Action   COMPONENT FEM SZ 3 R KNEE CRUCE RET CEMENTLESS BEAD W/ GARRISON - [id number]  COMPONENT FEM SZ 3 R KNEE CRUCE RET CEMENTLESS BEAD W/ GARRISON  [employer] [id number] Right 1 Implanted   COMPONENT PAT [id number] SUPERIOR/INFERIOR KNEE - [id number]  COMPONENT PAT [id number] SUPERIOR/INFERIOR KNEE VUMY1 [employer] [id number] Right 1 Implanted   BASEPLATE TIB SZ 3 AP44MM ML67MM KNEE TRITANIUM 4 CRUCFRM - [id number]  BASEPLATE TIB SZ 3 AP44MM ML67MM KNEE TRITANIUM 4 CRUCFRM  [employer] [id number] Right 1 Implanted   INSERT TIB CNDYL STBL 3 11 MM KNEE 5/PK X3 TRIATHLON - [id number]  INSERT TIB CNDYL STBL 3 11 MM KNEE 5/PK X3 TRIATHLON [id number] [employer] [id number] Right 1 Implanted       Pre-Operative Plan/Implants:   - #3 Femoral Component   - #3 Tibial Component   - 11 mm Polyethylene Component    Intra-Operative Findings: Prior to bony resection we found that the patient's knee lacked approximately 5 degrees of extension. Also prior to bony resection we noted a varus coronal deformity. Intra-operatively we noted that the articular surfaces were arthritic with cartilage loss of

## 2024-05-13 NOTE — ANESTHESIA PROCEDURE NOTES
Peripheral Block    Patient location during procedure: pre-op  Reason for block: post-op pain management and at surgeon's request  Start time: 5/13/2024 7:31 AM  End time: 5/13/2024 7:35 AM  Staffing  Performed: anesthesiologist   Anesthesiologist: Isac Kilpatrick MD  Performed by: Isac Kilpatrick MD  Authorized by: Isac Kilpatrick MD    Preanesthetic Checklist  Completed: patient identified, IV checked, site marked, risks and benefits discussed, surgical/procedural consents, equipment checked, pre-op evaluation, timeout performed, anesthesia consent given, oxygen available, monitors applied/VS acknowledged and blood product R/B/A discussed and consented  Peripheral Block   Patient position: supine  Prep: ChloraPrep  Provider prep: sterile gloves and mask  Patient monitoring: continuous pulse ox, cardiac monitor, frequent blood pressure checks, IV access, oxygen and responsive to questions  Block type: Femoral  Adductor canal  Laterality: right  Injection technique: single-shot  Guidance: ultrasound guided  Local infiltration: lidocaine  Infiltration strength: 1 %  Local infiltration: lidocaine  Dose: 3 mL    Needle   Needle type: insulated echogenic nerve stimulator needle   Needle gauge: 20 G  Needle localization: ultrasound guidance  Needle length: 10 cm  Assessment   Injection assessment: negative aspiration for heme, no paresthesia on injection, no intravascular symptoms and low pressure verified by pressure monitor  Paresthesia pain: none  Slow fractionated injection: yes  Hemodynamics: stable  Outcomes: uncomplicated    Medications Administered  ropivacaine (NAROPIN) injection 0.2% - Perineural   20 mL - 5/13/2024 7:31:00 AM  dexAMETHasone (DECADRON) injection 4 mg/mL - Perineural   4 mg - 5/13/2024 7:31:00 AM

## 2024-05-13 NOTE — ANESTHESIA POSTPROCEDURE EVALUATION
Department of Anesthesiology  Postprocedure Note    Patient: Renetta Cope  MRN: [id number]  YOB: 1950  Date of evaluation: 5/13/2024    Procedure Summary       Date: 05/13/24 Room / Location: [facility] MAIN OR 06 / [facility] MAIN OR    Anesthesia Start: 0749 Anesthesia Stop: 1006    Procedure: Right KNEE TOTAL ARTHROPLASTY ROBOTIC, [place]/SATISH/ SDD (Right: Knee) Diagnosis:       Primary osteoarthritis of right knee      Acquired deformity of right knee      (Primary osteoarthritis of right knee [M17.11])      (Acquired deformity of right knee [M21.961])    Surgeons: Mars Lawson MD Responsible Provider: Isac Kilpatrick MD    Anesthesia Type: spinal ASA Status: 3            Anesthesia Type: No value filed.    Kylie Phase I: Kylie Score: 9    Kylie Phase II:      Anesthesia Post Evaluation    Patient location during evaluation: PACU  Patient participation: complete - patient participated  Level of consciousness: awake and alert  Airway patency: patent  Nausea & Vomiting: no nausea and no vomiting  Cardiovascular status: hemodynamically stable  Respiratory status: acceptable, nonlabored ventilation and spontaneous ventilation  Hydration status: euvolemic  Comments: BP (!) 108/57   Pulse 77   Temp 98.3 °F (36.8 °C) (Infrared)   Resp 14   Ht 1.549 m (5' 0.98\")   Wt 63.5 kg (140 lb)   SpO2 97%   BMI 26.47 kg/m²     Multimodal analgesia pain management approach  Pain management: adequate and satisfactory to patient    No notable events documented.

## 2024-05-13 NOTE — ACP (ADVANCE CARE PLANNING)
Advance Care Planning     General Advance Care Planning (ACP) Conversation    Date of Conversation: 1/25/2024  Conducted with: Patient with Decision Making Capacity  Other persons present: None    Healthcare Decision Maker:    Primary Decision Maker: Torito Cope - [facility] - [phone]  Click here to complete Healthcare Decision Makers including selection of the Healthcare Decision Maker Relationship (ie \"Primary\").      Length of Voluntary ACP Conversation in minutes:  <16 minutes (Non-Billable)    Michell Schroeder

## 2024-05-13 NOTE — H&P
Patient ID:  Renetta Cope  [id number]  73 y.o.  1950    Today: May 13, 2024          CC:  Right  Knee pain    HPI:   The patient has end stage arthritis of the right knee. The patient was evaluated and examined during a consultation prior to today. The patient complains of knee pain with activities, reports pain as mostly occurring along the joint lines, reports stiffness of the knee with prolonged inactivity, and swelling/pain at the end of the day and after increased physical activity. The pain affects the patient’s activities of daily living and quality of life. The patient has attempted and exhausted conservative treatment. There have been no changes to the patient's orthopedic condition since the last office visit.    Past Medical History:  Past Medical History:   Diagnosis Date    Aortitis ([facility])     followed by vascular surgery    Arthritis     GERD (gastroesophageal reflux disease)     managed with meds prn    Post-nasal drip     Prolonged emergence from general anesthesia     PVD (peripheral vascular disease) ([facility])     Thyroid disease     Hypo    Vertigo        Past Surgical History:  Past Surgical History:   Procedure Laterality Date    APPENDECTOMY      COLONOSCOPY      HYSTERECTOMY (CERVIX STATUS UNKNOWN)      ORTHOPEDIC SURGERY      toe surgery, both hands    UROLOGICAL SURGERY      bladder         Medications:     Prior to Admission medications    Medication Sig Start Date End Date Taking? Authorizing Provider   acetaminophen (TYLENOL) 500 MG tablet Take 2 tablets by mouth every 6 hours as needed for Pain  Patient not taking: Reported on 5/13/2024 5/5/24   Mars Lawson MD   aspirin (ASPIRIN 81) 81 MG EC tablet Take 1 tablet by mouth in the morning and at bedtime Take one tablet morning and evening  Patient not taking: Reported on 5/13/2024 5/5/24   Mars Lawson MD   gabapentin (NEURONTIN) 100 MG capsule Take 1 capsule by mouth 2 times daily for 15 days.  Patient not taking: Reported

## 2024-05-13 NOTE — PROGRESS NOTES
Discharge instructions provided to patient, all questions answered. Pt verbalized understanding of discharge instructions.

## 2024-05-13 NOTE — PROGRESS NOTES
TRANSFER - IN REPORT:    Verbal report received from Cooper CARVER on Renetta Cope  being received from PACU for routine post-op      Report consisted of patient's Situation, Background, Assessment and   Recommendations(SBAR).     Information from the following report(s) Surgery Report and Intake/Output was reviewed with the receiving nurse.    Opportunity for questions and clarification was provided.

## 2024-05-13 NOTE — ANESTHESIA PROCEDURE NOTES
Spinal Block    Patient location during procedure: OR  End time: 5/13/2024 8:03 AM  Reason for block: primary anesthetic  Staffing  Performed: anesthesiologist   Anesthesiologist: Isac Kilpatrick MD  Performed by: Isac Kilpatrick MD  Authorized by: Isac Kilpatrick MD    Spinal Block  Patient position: sitting  Prep: ChloraPrep  Patient monitoring: continuous pulse ox and oxygen  Approach: midline  Location: L3/L4  Provider prep: mask and sterile gloves  Local infiltration: lidocaine  Needle  Needle type: pencil-tip   Needle gauge: 25 G  Needle length: 3.5 in  Assessment  Swirl obtained: Yes  CSF: clear  Attempts: 1  Hemodynamics: stable  Additional Notes  Uneventful spinal block  Preanesthetic Checklist  Completed: patient identified, IV checked, site marked, risks and benefits discussed, surgical/procedural consents, equipment checked, pre-op evaluation, timeout performed, anesthesia consent given, oxygen available, monitors applied/VS acknowledged, fire risk safety assessment completed and verbalized and blood product R/B/A discussed and consented

## 2024-05-13 NOTE — CARE COORDINATION
Equipment   Potential DME Needed Walker   DME Ordered? No   Potential Assistance Purchasing Medications No   Type of Home Care Services PT   Patient expects to be discharged to: House   Services At/After Discharge   Transition of Care Consult (CM Consult) Home Health   Internal Home Health Yes   Services At/After Discharge Home Health;PT    Resource Information Provided? No   Mode of Transport at Discharge Other (see comment)  (Spouse)   Confirm Follow Up Transport Family   Condition of Participation: Discharge Planning   The Plan for Transition of Care is related to the following treatment goals: Pt is discharging home with [facility]   The Patient and/Or Patient Representative agree with the Discharge Plan? Yes   Freedom of Choice list was provided with basic dialogue that supports the patient's individualized plan of care/goals, treatment preferences, and shares the quality data associated with the providers?  Yes     HODAN Vallejo

## 2024-05-13 NOTE — PROGRESS NOTES
4 Eyes Skin Assessment     NAME:  Renetta Cope  YOB: 1950  MEDICAL RECORD NUMBER:  [id number]    The patient is being assessed for  Admission    I agree that at least one RN has performed a thorough Head to Toe Skin Assessment on the patient. ALL assessment sites listed below have been assessed.      Areas assessed by both nurses:    Head, Face, Ears, Shoulders, Back, Chest, Arms, Elbows, Hands, Sacrum. Buttock, Coccyx, Ischium, and Legs. Feet and Heels        Does the Patient have a Wound? No noted wound(s)       Amos Prevention initiated by RN: Yes  Wound Care Orders initiated by RN: No    Pressure Injury (Stage 3,4, Unstageable, DTI, NWPT, and Complex wounds) if present, place Wound referral order by RN under : No    New Ostomies, if present place, Ostomy referral order under : No     Nurse 1 eSignature: Electronically signed by Rolanda Jade RN on 5/13/24 at 11:59 AM EDT    **SHARE this note so that the co-signing nurse can place an eSignature**    Nurse 2 eSignature: Electronically signed by Aixa Stacy RN on 5/13/24 at 1:37 PM EDT

## 2024-05-13 NOTE — PROGRESS NOTES
ACUTE PHYSICAL THERAPY GOALS:   (Developed with and agreed upon by patient and/or caregiver.)  GOALS (1-4 days):  (1.)Ms. Cope will move from supine to sit and sit to supine  in bed with INDEPENDENT.  (2.)Ms. Cope will transfer from bed to chair and chair to bed with SUPERVISION using the least restrictive device.  (3.)Ms. Cope will ambulate with SUPERVISION for 300 feet with the least restrictive device.  (4.)Ms. Cope will ambulate up/down 3 steps with bilateral  railing with STAND BY ASSIST. Met 5/13  (5.)Ms. Cope will increase right knee ROM to 0-90°.  ________________________________________________________________________________________________           PHYSICAL THERAPY: TOTAL KNEE ARTHROPLASTY Initial Assessment and PM  (Link to Caseload Tracking: PT Visit Days : 1  Acknowledge Orders  Time In/Out  PT Charge Capture  Rehab Caseload Tracker  Episode   Renetta Cope is a 73 y.o. female   PRIMARY DIAGNOSIS: Osteoarthritis of right knee, unspecified osteoarthritis type  Primary osteoarthritis of right knee [M17.11]  Acquired deformity of right knee [M21.961]  Osteoarthritis of right knee, unspecified osteoarthritis type [M17.11]  Procedure(s) (LRB):  Right KNEE TOTAL ARTHROPLASTY ROBOTIC, Rosie/SATISH/ SDD (Right)  Day of Surgery  Reason for Referral: Pain in Right Knee (M25.561)  Stiffness of Right Knee, Not elsewhere classified (M25.661)  Difficulty in walking, Not elsewhere classified (R26.2)  Outpatient in a bed: Payor: MEDICARE / Plan: MEDICARE PART A AND B / Product Type: *No Product type* /     REHAB RECOMMENDATIONS:   Recommendation to date pending progress:  Setting:  Home Health Therapy    Equipment:    Rolling Walker     RANGE OF MOTION:   Right Knee Flexion: R Knee Flexion (0-145): 85  Right Knee Extension: R Knee Extension (0): 0     GAIT: I Mod I S SBA CGA Min Mod Max Total  NT x2 Comments:   Level of Assistance [] [] [] [x] [] [] [] [] [] [] []            Weightbearing Status

## 2024-05-13 NOTE — DISCHARGE INSTRUCTIONS
[facility]      Patient Discharge Instructions    Renetta Cope / [id number] : 1950    Admitted 2024 Discharged: 2024     IF YOU HAVE ANY PROBLEMS ONCE YOU ARE AT HOME CALL THE FOLLOWING NUMBERS:   Main office number: [phone]      Medications    The medications you are to continue on are listed on the medication reconciliation sheet.   Narcotic pain medications as well as supplemental iron can cause constipation. If this occurs try stopping the narcotic pain medication and/or the iron.   It is important that you take the medication exactly as they are prescribed.  Medications which increase your risk of blood clots are listed to stop for 5 weeks after surgery as well as medications or supplements which increase your risk of bleeding complications.   Keep your medication in the bottles provided by the pharmacist and keep a list of the medication names, dosages, and times to be taken in your wallet.   Do not take other medications without consulting your doctor.       Important Information    Do NOT smoke as this will greatly increase your risk of infection!    Resume your prehospital diet. If you have excessive nausea or vomitting call your doctor's office     Leg swelling and warmth is normal for 6 months after surgery. If you experience swelling in your leg elevate you leg while laying down with your toes above your heart. If you have sudden onset severe swelling with leg pain call our office. Use Jose Raul Hose stockings until we see you in the office for your follow up appointment.    The stitches deep inside take approximately 6 months to dissolve. There will be sharp shooting, stinging and burning pain. This is normal and will resolve between 3-6 months after surgery.     Difficulty sleeping is normal following total Knee and Hip replacement. You may try melatonin, an over-the-counter sleep aid or benadryl to help with sleep. Most patients will resume sleeping through the night 8

## 2024-05-14 ENCOUNTER — TELEPHONE (OUTPATIENT)
Dept: ORTHOPEDICS UNIT | Age: 74
End: 2024-05-14

## 2024-05-14 NOTE — TELEPHONE ENCOUNTER
Called to follow up after TKA with SDD on 5/13/24.  Doing \" pretty good.\"  Post op pain is managed.  [facility] is scheduled for 1500.  Reviewed importance of ambulating at least every 45 minutes to an hour during the day.  Reviewed cool jet ice machine protocol.  Advised antibiotics are not ordered post op.  Advised to keep surgical leg straight.  Advised regarding expected increase in post op pain and swelling around POD # 3.  Reviewed contact number for ortho navigator.

## 2024-05-15 ENCOUNTER — TELEPHONE (OUTPATIENT)
Dept: ORTHOPEDIC SURGERY | Age: 74
End: 2024-05-15

## 2024-05-20 ENCOUNTER — TELEPHONE (OUTPATIENT)
Dept: ORTHOPEDIC SURGERY | Age: 74
End: 2024-05-20

## 2024-05-20 DIAGNOSIS — Z96.651 STATUS POST RIGHT KNEE REPLACEMENT: Primary | ICD-10-CM

## 2024-05-21 ENCOUNTER — TELEPHONE (OUTPATIENT)
Dept: ORTHOPEDIC SURGERY | Age: 74
End: 2024-05-21

## 2024-05-21 RX ORDER — OXYCODONE HYDROCHLORIDE 5 MG/1
5 TABLET ORAL EVERY 6 HOURS PRN
Qty: 28 TABLET | Refills: 0 | Status: SHIPPED | OUTPATIENT
Start: 2024-05-21 | End: 2024-05-28

## 2024-05-21 NOTE — TELEPHONE ENCOUNTER
Klaus Lala w/ [facility] called wanting to know about pt aqua cell dressing pt states that it is wet from shower he asked if he could put a guaze dressing or does it has to be aqua cell dressing [phone] please f/u

## 2024-05-21 NOTE — TELEPHONE ENCOUNTER
Home health will apply bordered gauze to patients incision, since aquacel is wet from the shower.

## 2024-05-23 ENCOUNTER — TELEPHONE (OUTPATIENT)
Dept: ORTHOPEDIC SURGERY | Age: 74
End: 2024-05-23

## 2024-05-23 NOTE — TELEPHONE ENCOUNTER
Patients  will  Aquacel dressing from [facility] to be changed tomorrow. They will re evaluate at that time. I explained to the patient that DJW and MM are out of the office tomorrow and I am unable to bring her in.

## 2024-05-23 NOTE — TELEPHONE ENCOUNTER
Call Klaus IRAHETA w/ [facility] [phone] is wanting patient to be seen by tomorrow to get new aqua cell Drsg and have knee checked out to make sure it's not infected, says leg is swollen and having spasms

## 2024-05-29 ENCOUNTER — TELEPHONE (OUTPATIENT)
Dept: ORTHOPEDIC SURGERY | Age: 74
End: 2024-05-29

## 2024-05-29 DIAGNOSIS — Z96.651 STATUS POST RIGHT KNEE REPLACEMENT: Primary | ICD-10-CM

## 2024-05-29 RX ORDER — GABAPENTIN 100 MG/1
100 CAPSULE ORAL 2 TIMES DAILY
Qty: 30 CAPSULE | Refills: 0 | Status: SHIPPED | OUTPATIENT
Start: 2024-05-29 | End: 2024-06-13

## 2024-05-29 RX ORDER — OXYCODONE HYDROCHLORIDE 5 MG/1
10 TABLET ORAL EVERY 4 HOURS PRN
Qty: 60 TABLET | Refills: 0 | Status: SHIPPED | OUTPATIENT
Start: 2024-05-29 | End: 2024-06-05

## 2024-05-29 NOTE — TELEPHONE ENCOUNTER
She needs a refill on Oxycodone and gabapentin 100mg if he wants her to stay on it to [facility] in [place].

## 2024-06-05 ENCOUNTER — OFFICE VISIT (OUTPATIENT)
Dept: ORTHOPEDIC SURGERY | Age: 74
End: 2024-06-05

## 2024-06-05 DIAGNOSIS — Z96.651 STATUS POST RIGHT KNEE REPLACEMENT: Primary | ICD-10-CM

## 2024-06-05 DIAGNOSIS — Z09 FOLLOW-UP EXAMINATION FOLLOWING SURGERY: ICD-10-CM

## 2024-06-05 PROCEDURE — 99024 POSTOP FOLLOW-UP VISIT: CPT | Performed by: NURSE PRACTITIONER

## 2024-06-05 RX ORDER — AMOXICILLIN 500 MG/1
TABLET, FILM COATED ORAL
Qty: 12 TABLET | Refills: 1 | Status: SHIPPED | OUTPATIENT
Start: 2024-06-05

## 2024-06-05 NOTE — PROGRESS NOTES
Patient ID:  Renetta Cope  [id number]  73 y.o.  1950    Today: June 5, 2024    CHIEF COMPLAINT:  Follow-up right total knee replacement    HISTORY:  The patient presents today for 3-week follow-up after knee replacement.  The patient is doing progressing slowly.  She is on aspirin for DVT prophylaxis.  The patient is working with physical therapy to regain some strength and motion.  Continues to take medication appropriately.  The patient has done a good job keeping dressing/wound clean and dry.  The patient is progressing nicely postoperatively.    PE:  Incision is examined which is well healed.  No erythema, induration or drainage. No significant fluid accumulation around the surgical site.  ROM is 5 to 85 degrees.  Overall the knee appears stable to varus/valgus stress throughout arc of motion.  Anterior drawer testing at 45 and 90º of flexion is stable.  Posterior drawer testing is stable.  Distally able to plantar and dorsiflex foot and ankle.  Sensation intact.  Limb is perfused.  No sign of DVT.    X-RAYS:    XR RT Knee 2/3 view  Views Obtained: AP, Lateral and Sunrise views of the right knee  Indication: Postop Right TKA  Findings: All hardware to be intact.  All the components appear to be well sized without any evidence of loosening.  Overall mechanical alignment appears to be within acceptable criteria. No evidence of fracture.  Patella appears to be tracking appropriately within the trochlear groove.  Impression: Normal Xray after right total knee replacement    ALTON KELLEY, DARI - CNP    ASSESSMENT:  3 Weeks S/P Right Total Knee Replacement    PLAN:  Continue activity and weight bearing as tolerated.  Continue PT/OT to focus on strengthening and stretching.  Continue to take pain medication appropriately.  The patient will continue to take aspirin for another week for a full month of DVT prophylaxis.  The patient is given a script for antibiotics to be taken before dental

## 2024-06-10 ENCOUNTER — HOSPITAL ENCOUNTER (OUTPATIENT)
Dept: PHYSICAL THERAPY | Age: 74
Setting detail: RECURRING SERIES
Discharge: HOME | End: 2024-06-13
Payer: MEDICARE

## 2024-06-10 DIAGNOSIS — M25.661 STIFFNESS OF RIGHT KNEE, NOT ELSEWHERE CLASSIFIED: ICD-10-CM

## 2024-06-10 DIAGNOSIS — Z96.651 S/P TOTAL KNEE ARTHROPLASTY, RIGHT: Primary | ICD-10-CM

## 2024-06-10 DIAGNOSIS — M25.561 RIGHT KNEE PAIN, UNSPECIFIED CHRONICITY: ICD-10-CM

## 2024-06-10 DIAGNOSIS — R26.2 DIFFICULTY IN WALKING, NOT ELSEWHERE CLASSIFIED: ICD-10-CM

## 2024-06-10 DIAGNOSIS — F40.298 FEAR OF FALLING: ICD-10-CM

## 2024-06-10 DIAGNOSIS — M62.81 MUSCLE WEAKNESS (GENERALIZED): ICD-10-CM

## 2024-06-10 PROCEDURE — 97110 THERAPEUTIC EXERCISES: CPT

## 2024-06-10 PROCEDURE — 97162 PT EVAL MOD COMPLEX 30 MIN: CPT

## 2024-06-10 NOTE — THERAPY EVALUATION
Therapeutic Activites, and Therapeutic Exercise/Strengthening   GOALS: (Goals have been discussed and agreed upon with patient.)     Goals: 6 weeks  Goal Met   1. Renetta Cope will be able to perform 5 sit to stand transfers independently from a 18  inch high surface in <= 15 seconds to rise from chair/toilet at home and in the community with equal weight bearing between LEs  1.  [] Date:   2. Renetta Cope will increase steady state gait speed to >= 2.62 ft/sec to improve mobility in household and community and decrease fall risk.  2.  [] Date:   3. Renetta Cope will be able to push and pull 50 lbs to increase safety and functional capacity required for household chores and push and pull  3.  [] Date:   4. Renetta Cope will be able to carry >=12 lbs in bilateral UEs over 150 feet in order to carry groceries and laundry between rooms without LOB and carry basket of produce from garden. 4.  [] Date:   5. Renetta Cope will increase knee flexion to >=100 dg in order to improve ability to stoop and squat.    6. Renetta Cope will increase knee extension to <=5 dg in order to improve gait mechanics     Goals: 12 weeks    5. Renetta Cope will be independent in HEP for balance, ROM, stretching, and strengthening in order to maintain functional gains and decrease fall risk. 5.  [] Date:   6. Renetta Cope will be able to complete a floor transfer with B UE support for fall recovery x Independent 6.  [] Date:   7. Renetta Cope will be able to complete a 6 minute walk test over level and mild unlevel terrain at >=1200 ft to be comparable to age related norms. 7.  [] Date:   8. Renetta Cope will be able to reach downward toward floor to retrieve a >= 45 lb object without LOB in order to safely complete heavy household chores. 8.  [] Date:   9. Renetta Cope will increase max gait speed to 4.32 ft/sec to improve mobility in household and community and decrease fall risk.  9.  []

## 2024-06-10 NOTE — PROGRESS NOTES
Renetta Cope  : 1950  Primary: [employer] (Medicare)  Secondary: [employer] MEDICARE SUPP [facility] @ [place] [address] [place] [address]  Phone: [phone]  Fax: [phone] Plan Frequency: 2x/wk for 12 weeks  Plan of Care/Certification Expiration Date: 24        Plan of Care/Certification Expiration Date:  Plan of Care/Certification Expiration Date: 24    Frequency/Duration: Plan Frequency: 2x/wk for 12 weeks      Time In/Out:   Time In: 1530  Time Out: 1630      PT Visit Info:         Visit Count:  1    OUTPATIENT PHYSICAL THERAPY:   Treatment Note 6/10/2024       Episode  (R TKA)               Treatment Diagnosis:    S/P total knee arthroplasty, right  Stiffness of right knee, not elsewhere classified  Right knee pain, unspecified chronicity  Muscle weakness (generalized)  Fear of falling  Difficulty in walking, not elsewhere classified  Medical/Referring Diagnosis:    Status post right knee replacement [Z96.651]    Referring Physician:  Mars Lawson MD MD Orders:  PT Eval and Treat   Return MD Appt:  24   Date of Onset:  Onset Date: 24     Allergies:   Levofloxacin, Gluten, and Morphine  Restrictions/Precautions:   Fear of falling      Interventions Planned (Treatment may consist of any combination of the following):     See Assessment Note    Subjective Comments:   Please see eval  Initial Pain Level::     5 /10  Post Session Pain Level:     5   /10  Medications Last Reviewed:  6/10/2024  Updated Objective Findings:  See Evaluation Note from today  Treatment     THERAPEUTIC EXERCISE: ( 15 minutes):    Exercises per grid below to improve mobility, strength, balance, and coordination. Required minimal visual, verbal, manual, and tactile cues to promote proper body mechanics.  Progressed resistance and repetitions as indicated.     Date:  6/10/2024 Date:   Date:     Activity/Exercise Parameters Parameters Parameters

## 2024-06-14 ENCOUNTER — HOSPITAL ENCOUNTER (OUTPATIENT)
Dept: PHYSICAL THERAPY | Age: 74
Setting detail: RECURRING SERIES
Discharge: HOME | End: 2024-06-17
Payer: MEDICARE

## 2024-06-14 PROCEDURE — 97530 THERAPEUTIC ACTIVITIES: CPT

## 2024-06-14 PROCEDURE — 97110 THERAPEUTIC EXERCISES: CPT

## 2024-06-14 NOTE — PROGRESS NOTES
Renetta Cope  : 1950  Primary: [employer] (Medicare)  Secondary: [employer]M MEDICARE SUPP [facility] @ [place] [address] [address] [place] [address]  Phone: [phone]  Fax: [phone] Plan Frequency: 2x/wk for 12 weeks  Plan of Care/Certification Expiration Date: 24        Plan of Care/Certification Expiration Date:  Plan of Care/Certification Expiration Date: 24    Frequency/Duration: Plan Frequency: 2x/wk for 12 weeks      Time In/Out:   Time In: 1050  Time Out: 1130      PT Visit Info:         Visit Count:  2    OUTPATIENT PHYSICAL THERAPY:   Treatment Note 2024       Episode  (R TKA)               Treatment Diagnosis:    S/P total knee arthroplasty, right  Stiffness of right knee, not elsewhere classified  Right knee pain, unspecified chronicity  Muscle weakness (generalized)  Fear of falling  Difficulty in walking, not elsewhere classified  Medical/Referring Diagnosis:    Status post right knee replacement [Z96.651]    Referring Physician:  Mars Lawson MD MD Orders:  PT Eval and Treat   Return MD Appt:  24   Date of Onset:  Onset Date: 24     Allergies:   Levofloxacin, Gluten, and Morphine  Restrictions/Precautions:   Fear of falling      Interventions Planned (Treatment may consist of any combination of the following):     See Assessment Note    Subjective Comments:   Pt reports she went out a trimmed a few of her herbs and dried her herbs.     Initial Pain Level::     5 /10  Post Session Pain Level:     5   /10  Medications Last Reviewed:  2024  Updated Objective Findings:  knee flexion on Nustep 75 dg,   Treatment     THERAPEUTIC EXERCISE: ( 12 minutes):    Exercises per grid below to improve mobility, strength, balance, and coordination. Required minimal visual, verbal, manual, and tactile cues to promote proper body mechanics.  Progressed resistance and repetitions as indicated.     Date:  6/10/2024 Date:  24

## 2024-06-17 ENCOUNTER — HOSPITAL ENCOUNTER (OUTPATIENT)
Dept: PHYSICAL THERAPY | Age: 74
Setting detail: RECURRING SERIES
Discharge: HOME | End: 2024-06-20
Payer: MEDICARE

## 2024-06-17 ENCOUNTER — APPOINTMENT (OUTPATIENT)
Dept: PHYSICAL THERAPY | Age: 74
End: 2024-06-17
Payer: MEDICARE

## 2024-06-17 PROCEDURE — 97110 THERAPEUTIC EXERCISES: CPT

## 2024-06-17 PROCEDURE — 97530 THERAPEUTIC ACTIVITIES: CPT

## 2024-06-17 NOTE — PROGRESS NOTES
Portal  Appt Desk  Attendance Report     Future Appointments   Date Time Provider Department Center   6/21/2024 12:15 PM Selma Rutledge, PT [facility]   6/24/2024  9:30 AM Delia Holt R, PT [facility]   6/27/2024  9:00 AM Delia Holt R, PT [facility]   7/1/2024  9:45 AM Sheree Holton R, PT [facility]   7/3/2024  9:45 AM Sheree Holton R, PT [facility]   7/8/2024  9:00 AM Delia Holt R, PT [facility]   7/10/2024  9:00 AM Delia Holt R, PT [facility]   7/18/2024 11:15 AM Delia Holt R, PT [facility]   7/22/2024 11:15 AM Lizz Bcukley, PT [facility]   7/23/2024 10:05 AM Mars Lawson MD [facility]   7/25/2024 11:00 AM Lizz Buckley, PT [facility]   7/30/2024 11:15 AM Delia Holt R, PT [facility]   8/6/2024 11:15 AM Delia Holt R, PT [facility]   8/8/2024 11:15 AM Delia Holt R, PT [facility]   8/13/2024 11:15 AM Sheree Holton R, PT [facility]   8/15/2024 11:15 AM Delia Holt R, PT [facility]   8/20/2024 11:15 AM Delia Holt R, PT [facility]   8/22/2024 11:15 AM Sheree Holton R, PT [facility]   8/27/2024 11:15 AM Delia Holt R, PT [facility]   8/29/2024 11:15 AM Delia Holt, PT [facility]   9/3/2024 11:15 AM Delia Holt, PT [facility]   9/5/2024 11:15 AM Delia Holt, PT [facility]   11/1/2024  1:40 PM Cruz Forrest, APRN - CNP [facility]   1/3/2025 10:00 AM [facility] 1 [facility]   1/3/2025 11:00 AM Deniz Villagomez MD [facility]

## 2024-06-20 ENCOUNTER — TELEPHONE (OUTPATIENT)
Dept: ORTHOPEDIC SURGERY | Age: 74
End: 2024-06-20

## 2024-06-20 ENCOUNTER — APPOINTMENT (OUTPATIENT)
Dept: PHYSICAL THERAPY | Age: 74
End: 2024-06-20
Payer: MEDICARE

## 2024-06-20 DIAGNOSIS — Z96.651 STATUS POST RIGHT KNEE REPLACEMENT: Primary | ICD-10-CM

## 2024-06-20 RX ORDER — OXYCODONE HYDROCHLORIDE 5 MG/1
5 TABLET ORAL EVERY 6 HOURS PRN
Qty: 28 TABLET | Refills: 0 | Status: SHIPPED | OUTPATIENT
Start: 2024-06-20 | End: 2024-06-27

## 2024-06-20 RX ORDER — GABAPENTIN 300 MG/1
300 CAPSULE ORAL 2 TIMES DAILY
Qty: 30 CAPSULE | Refills: 0 | Status: SHIPPED | OUTPATIENT
Start: 2024-06-20 | End: 2024-07-05

## 2024-06-20 NOTE — TELEPHONE ENCOUNTER
She is requesting a refill on gabapentin. She has five days of Oxy left so she will run out over the weekend. Please send to [facility] in [place].

## 2024-06-21 ENCOUNTER — HOSPITAL ENCOUNTER (OUTPATIENT)
Dept: PHYSICAL THERAPY | Age: 74
Setting detail: RECURRING SERIES
Discharge: HOME | End: 2024-06-24
Payer: MEDICARE

## 2024-06-21 ENCOUNTER — TELEPHONE (OUTPATIENT)
Dept: ORTHOPEDIC SURGERY | Age: 74
End: 2024-06-21

## 2024-06-21 PROCEDURE — 97110 THERAPEUTIC EXERCISES: CPT

## 2024-06-21 NOTE — PROGRESS NOTES
Renetta Cope  : 1950  Primary: [employer] (Medicare)  Secondary: AN[employer]M MEDICARE SUPP [facility] @ [place] [address] [address] [place] [address]  Phone: [phone]  Fax: [phone] Plan Frequency: 2x/wk for 12 weeks  Plan of Care/Certification Expiration Date: 24        Plan of Care/Certification Expiration Date:  Plan of Care/Certification Expiration Date: 24    Frequency/Duration: Plan Frequency: 2x/wk for 12 weeks      Time In/Out:          PT Visit Info:         Visit Count:  4    OUTPATIENT PHYSICAL THERAPY:   Treatment Note 2024       Episode  (R TKA)               Treatment Diagnosis:    S/P total knee arthroplasty, right  Stiffness of right knee, not elsewhere classified  Right knee pain, unspecified chronicity  Muscle weakness (generalized)  Fear of falling  Difficulty in walking, not elsewhere classified  Medical/Referring Diagnosis:    Status post right knee replacement [Z96.651]    Referring Physician:  Mars Lawson MD MD Orders:  PT Eval and Treat   Return MD Appt:  24   Date of Onset:  Onset Date: 24     Allergies:   Levofloxacin, Gluten, and Morphine  Restrictions/Precautions:   Fear of falling      Interventions Planned (Treatment may consist of any combination of the following):     See Assessment Note    Subjective Comments:   Pt reports she was sore for approx 1.5 days after last session    Initial Pain Level::     5 /10  Post Session Pain Level:     5   /10  Medications Last Reviewed:  2024  Updated Objective Findings:  knee flexion on shuttle - 90 dg, extension -10 dg  Treatment     THERAPEUTIC EXERCISE: ( 38 minutes):    Exercises per grid below to improve mobility, strength, balance, and coordination. Required minimal visual, verbal, manual, and tactile cues to promote proper body mechanics.  Progressed resistance and repetitions as indicated.     Date:  6/10/2024 Date:  24 Date:  24

## 2024-06-21 NOTE — TELEPHONE ENCOUNTER
She called yesterday for a refill on her Gabapentin. She was on 100mg. What she picked up was 300mg. She wants to know why it was changed and she isn't sure she can take that much.

## 2024-06-24 ENCOUNTER — HOSPITAL ENCOUNTER (OUTPATIENT)
Dept: PHYSICAL THERAPY | Age: 74
Setting detail: RECURRING SERIES
Discharge: HOME | End: 2024-06-27
Payer: MEDICARE

## 2024-06-24 ENCOUNTER — TELEPHONE (OUTPATIENT)
Dept: ORTHOPEDIC SURGERY | Age: 74
End: 2024-06-24

## 2024-06-24 PROCEDURE — 97110 THERAPEUTIC EXERCISES: CPT

## 2024-06-24 PROCEDURE — 97530 THERAPEUTIC ACTIVITIES: CPT

## 2024-06-24 RX ORDER — GABAPENTIN 100 MG/1
100 CAPSULE ORAL 2 TIMES DAILY
Qty: 30 CAPSULE | Refills: 0 | Status: SHIPPED | OUTPATIENT
Start: 2024-06-24 | End: 2024-07-09

## 2024-06-24 NOTE — TELEPHONE ENCOUNTER
She called last week about the Gabapentin and hasn't heard back. The amount sent was way higher and she wants to get the 100mg sent in. She gets headaches so only wants to take a small amount. The 100mg is what works for her. She has therapy from 9: but would like to hear something back.

## 2024-06-24 NOTE — PROGRESS NOTES
Renetta Cope  : 1950  Primary: [employer] (Medicare)  Secondary: ANTHEM MEDICARE SUPP [facility] @ [place] [address] [address] [place] [address]  Phone: [phone]  Fax: [phone] Plan Frequency: 2x/wk for 12 weeks  Plan of Care/Certification Expiration Date: 24        Plan of Care/Certification Expiration Date:  Plan of Care/Certification Expiration Date: 24    Frequency/Duration: Plan Frequency: 2x/wk for 12 weeks      Time In/Out:   Time In: 930  Time Out: 101      PT Visit Info:         Visit Count:  5    OUTPATIENT PHYSICAL THERAPY:   Treatment Note 2024       Episode  (R TKA)               Treatment Diagnosis:    S/P total knee arthroplasty, right  Stiffness of right knee, not elsewhere classified  Right knee pain, unspecified chronicity  Muscle weakness (generalized)  Fear of falling  Difficulty in walking, not elsewhere classified  Medical/Referring Diagnosis:    Status post right knee replacement [Z96.651]    Referring Physician:  Mars Lawson MD MD Orders:  PT Eval and Treat   Return MD Appt:  24   Date of Onset:  Onset Date: 24     Allergies:   Levofloxacin, Gluten, and Morphine  Restrictions/Precautions:   Fear of falling      Interventions Planned (Treatment may consist of any combination of the following):     See Assessment Note    Subjective Comments:   Pt reports she was sore for approx 1.5 days after last session    Initial Pain Level::     5 /10  Post Session Pain Level:     5   /10  Medications Last Reviewed:  2024  Updated Objective Findings:  knee flexion on shuttle - 85 dg , Pre session extension: -13 dg post shuttle  -10 dg  Knee ROM after sled - -8 to 87 dg   Treatment     THERAPEUTIC EXERCISE: ( 30 minutes):    Exercises per grid below to improve mobility, strength, balance, and coordination. Required minimal visual, verbal, manual, and tactile cues to promote proper body mechanics.

## 2024-06-27 ENCOUNTER — HOSPITAL ENCOUNTER (OUTPATIENT)
Dept: PHYSICAL THERAPY | Age: 74
Setting detail: RECURRING SERIES
Discharge: HOME | End: 2024-06-30
Payer: MEDICARE

## 2024-06-27 PROCEDURE — 97110 THERAPEUTIC EXERCISES: CPT

## 2024-06-27 NOTE — PROGRESS NOTES
Renetta Cope  : 1950  Primary: [employer] (Medicare)  Secondary: AN[employer]M MEDICARE SUPP [facility] @ [place] [address] [address] [place] [address]  Phone: [phone]  Fax: [phone] Plan Frequency: 2x/wk for 12 weeks  Plan of Care/Certification Expiration Date: 24        Plan of Care/Certification Expiration Date:  Plan of Care/Certification Expiration Date: 24    Frequency/Duration: Plan Frequency: 2x/wk for 12 weeks      Time In/Out:   Time In: 0900  Time Out: 09      PT Visit Info:         Visit Count:  6    OUTPATIENT PHYSICAL THERAPY:   Treatment Note 2024       Episode  (R TKA)               Treatment Diagnosis:    S/P total knee arthroplasty, right  Stiffness of right knee, not elsewhere classified  Right knee pain, unspecified chronicity  Muscle weakness (generalized)  Fear of falling  Difficulty in walking, not elsewhere classified  Medical/Referring Diagnosis:    Status post right knee replacement [Z96.651]    Referring Physician:  Mars Lawson MD MD Orders:  PT Eval and Treat   Return MD Appt:  24   Date of Onset:  Onset Date: 24     Allergies:   Levofloxacin, Gluten, and Morphine  Restrictions/Precautions:   Fear of falling      Interventions Planned (Treatment may consist of any combination of the following):     See Assessment Note    Subjective Comments:   Pt reports she has been working on her exercises at home    Initial Pain Level::     5 /10  Post Session Pain Level:     5   /10  Medications Last Reviewed:  2024  Updated Objective Findings:  knee flexion 90 dg extension - 7dg  Treatment     THERAPEUTIC EXERCISE: ( 38 minutes):    Exercises per grid below to improve mobility, strength, balance, and coordination. Required minimal visual, verbal, manual, and tactile cues to promote proper body mechanics.  Progressed resistance and repetitions as indicated.     Date:  6/10/2024 Date:  24

## 2024-07-01 ENCOUNTER — HOSPITAL ENCOUNTER (OUTPATIENT)
Dept: PHYSICAL THERAPY | Age: 74
Setting detail: RECURRING SERIES
Discharge: HOME | End: 2024-07-04
Payer: MEDICARE

## 2024-07-01 PROCEDURE — 97530 THERAPEUTIC ACTIVITIES: CPT

## 2024-07-01 PROCEDURE — 97110 THERAPEUTIC EXERCISES: CPT

## 2024-07-01 NOTE — PROGRESS NOTES
Renetta Cope  : 1950  Primary: [employer] (Medicare)  Secondary: AN[employer]M MEDICARE SUPP [facility] @ [place] [address] [place] [address]  Phone: [phone]  Fax: [phone] Plan Frequency: 2x/wk for 12 weeks  Plan of Care/Certification Expiration Date: 24        Plan of Care/Certification Expiration Date:  Plan of Care/Certification Expiration Date: 24    Frequency/Duration: Plan Frequency: 2x/wk for 12 weeks      Time In/Out:   Time In: 45  Time Out: 1030      PT Visit Info:         Visit Count:  7    OUTPATIENT PHYSICAL THERAPY:   Treatment Note 2024       Episode  (R TKA)               Treatment Diagnosis:    S/P total knee arthroplasty, right  Stiffness of right knee, not elsewhere classified  Right knee pain, unspecified chronicity  Muscle weakness (generalized)  Fear of falling  Difficulty in walking, not elsewhere classified  Medical/Referring Diagnosis:    Status post right knee replacement [Z96.651]    Referring Physician:  Mars Lawson MD MD Orders:  PT Eval and Treat   Return MD Appt:  24   Date of Onset:  Onset Date: 24     Allergies:   Levofloxacin, Gluten, and Morphine  Restrictions/Precautions:   Fear of falling      Interventions Planned (Treatment may consist of any combination of the following):     See Assessment Note    Subjective Comments:   Pt reports she picked her blueberries and blackberries.    Initial Pain Level::     5 /10  Post Session Pain Level:     5   /10  Medications Last Reviewed:  2024  Updated Objective Findings:    Start of session: -10 to 80 dg  knee flexion 89 dg extension - 6 dg  Treatment     THERAPEUTIC EXERCISE: ( 23 minutes):    Exercises per grid below to improve mobility, strength, balance, and coordination. Required minimal visual, verbal, manual, and tactile cues to promote proper body mechanics.  Progressed resistance and repetitions as indicated.

## 2024-07-03 ENCOUNTER — HOSPITAL ENCOUNTER (OUTPATIENT)
Dept: PHYSICAL THERAPY | Age: 74
Setting detail: RECURRING SERIES
Discharge: HOME | End: 2024-07-06
Payer: MEDICARE

## 2024-07-03 PROCEDURE — 97110 THERAPEUTIC EXERCISES: CPT

## 2024-07-03 NOTE — PROGRESS NOTES
Renetta Cope  : 1950  Primary: [employer] (Medicare)  Secondary: ANTHEM MEDICARE SUPP [facility] @ [place] [address] [address] [place] [address]  Phone: [phone]  Fax: [phone] Plan Frequency: 2x/wk for 12 weeks  Plan of Care/Certification Expiration Date: 24        Plan of Care/Certification Expiration Date:  Plan of Care/Certification Expiration Date: 24    Frequency/Duration: Plan Frequency: 2x/wk for 12 weeks      Time In/Out:   Time In: 45  Time Out: 1030      PT Visit Info:         Visit Count:  8    OUTPATIENT PHYSICAL THERAPY:   Treatment Note 7/3/2024       Episode  (R TKA)               Treatment Diagnosis:    S/P total knee arthroplasty, right  Stiffness of right knee, not elsewhere classified  Right knee pain, unspecified chronicity  Muscle weakness (generalized)  Fear of falling  Difficulty in walking, not elsewhere classified  Medical/Referring Diagnosis:    Status post right knee replacement [Z96.651]    Referring Physician:  Mars Lawson MD MD Orders:  PT Eval and Treat   Return MD Appt:  24   Date of Onset:  Onset Date: 24     Allergies:   Levofloxacin, Gluten, and Morphine  Restrictions/Precautions:   Fear of falling      Interventions Planned (Treatment may consist of any combination of the following):     See Assessment Note    Subjective Comments:   Pt reports she has been working on her exercises    Initial Pain Level::     5 /10  Post Session Pain Level:     5   /10  Medications Last Reviewed:  7/3/2024  Updated Objective Findings:      knee AAROM-  -6 dg to 90 dg  Treatment     THERAPEUTIC EXERCISE: ( 38 minutes):    Exercises per grid below to improve mobility, strength, balance, and coordination. Required minimal visual, verbal, manual, and tactile cues to promote proper body mechanics.  Progressed resistance and repetitions as indicated.     Date:  6/10/2024 Date:  24 Date:  24

## 2024-07-08 ENCOUNTER — APPOINTMENT (OUTPATIENT)
Dept: PHYSICAL THERAPY | Age: 74
End: 2024-07-08
Payer: MEDICARE

## 2024-07-08 ENCOUNTER — TELEPHONE (OUTPATIENT)
Dept: ORTHOPEDIC SURGERY | Age: 74
End: 2024-07-08

## 2024-07-08 DIAGNOSIS — G89.18 POSTOPERATIVE PAIN: Primary | ICD-10-CM

## 2024-07-08 RX ORDER — TRAMADOL HYDROCHLORIDE 50 MG/1
50 TABLET ORAL EVERY 4 HOURS PRN
Qty: 42 TABLET | Refills: 0 | Status: SHIPPED | OUTPATIENT
Start: 2024-07-08 | End: 2024-07-15

## 2024-07-08 RX ORDER — GABAPENTIN 100 MG/1
100 CAPSULE ORAL 2 TIMES DAILY
Qty: 30 CAPSULE | Refills: 0 | Status: SHIPPED | OUTPATIENT
Start: 2024-07-08 | End: 2024-07-23

## 2024-07-08 NOTE — TELEPHONE ENCOUNTER
I called and spoke to patient. I let her know that Dr Lawson said she was too far out for Oxycodone so he sent her Tramadol and Gabapentin. Patient verbalized understanding.

## 2024-07-10 ENCOUNTER — HOSPITAL ENCOUNTER (OUTPATIENT)
Dept: PHYSICAL THERAPY | Age: 74
Setting detail: RECURRING SERIES
Discharge: HOME | End: 2024-07-13
Payer: MEDICARE

## 2024-07-10 PROCEDURE — 97110 THERAPEUTIC EXERCISES: CPT

## 2024-07-10 PROCEDURE — 97530 THERAPEUTIC ACTIVITIES: CPT

## 2024-07-10 NOTE — PROGRESS NOTES
Renetta Cope  : 1950  Primary: [employer] (Medicare)  Secondary: ANTHEM MEDICARE SUPP [facility] @ [place] [address] [address] [place] [address]  Phone: [phone]  Fax: [phone] Plan Frequency: 2x/wk for 12 weeks  Plan of Care/Certification Expiration Date: 24        Plan of Care/Certification Expiration Date:  Plan of Care/Certification Expiration Date: 24    Frequency/Duration: Plan Frequency: 2x/wk for 12 weeks      Time In/Out:   Time In: 0900  Time Out: 09      PT Visit Info:         Visit Count:  9    OUTPATIENT PHYSICAL THERAPY:   Treatment Note 7/10/2024       Episode  (R TKA)               Treatment Diagnosis:    S/P total knee arthroplasty, right  Stiffness of right knee, not elsewhere classified  Right knee pain, unspecified chronicity  Muscle weakness (generalized)  Fear of falling  Difficulty in walking, not elsewhere classified  Medical/Referring Diagnosis:    Status post right knee replacement [Z96.651]    Referring Physician:  Mars Lawson MD MD Orders:  PT Eval and Treat   Return MD Appt:  24   Date of Onset:  Onset Date: 24     Allergies:   Levofloxacin, Gluten, and Morphine  Restrictions/Precautions:   Fear of falling      Interventions Planned (Treatment may consist of any combination of the following):     See Assessment Note    Subjective Comments:   Pt reports that she was out in her garden yesterday picking blueberries, peaches, and blackberries    Initial Pain Level::     5 /10  Post Session Pain Level:     5   /10  Medications Last Reviewed:  7/10/2024  Updated Objective Findings:   Please see progress note  Treatment     THERAPEUTIC EXERCISE: ( 30 minutes):    Exercises per grid below to improve mobility, strength, balance, and coordination. Required minimal visual, verbal, manual, and tactile cues to promote proper body mechanics.  Progressed resistance and repetitions as indicated.

## 2024-07-10 NOTE — PROGRESS NOTES
Renetta Cope  : 1950  Primary: [employer] (Medicare)  Secondary: ANTHEM MEDICARE SUPP [facility] @ [place] [address] [address] [place] [address]  Phone: [phone]  Fax: [phone] Plan Frequency: 2x/wk for 12 weeks    Plan of Care/Certification Expiration Date: 24        Plan of Care/Certification Expiration Date:  Plan of Care/Certification Expiration Date: 24    Frequency/Duration: Plan Frequency: 2x/wk for 12 weeks      Time In/Out:   Time In: 0900  Time Out: 09      PT Visit Info:         Visit Count:  9                OUTPATIENT PHYSICAL THERAPY:             Progress Report 7/10/2024               Episode (R TKA)         Treatment Diagnosis:     S/P total knee arthroplasty, right  Stiffness of right knee, not elsewhere classified  Right knee pain, unspecified chronicity  Muscle weakness (generalized)  Fear of falling  Difficulty in walking, not elsewhere classified  Medical/Referring Diagnosis:    Status post right knee replacement [Z96.651]    Referring Physician:  Mars Lawosn MD MD Orders:  PT Eval and Treat   Return MD Appt:  24  Date of Onset:  Onset Date: 24     Allergies:  Levofloxacin, Gluten, and Morphine  Restrictions/Precautions:    None      Medications Last Reviewed:  7/10/2024       Range of Motion    [] This section not tested    AROM RIGHT (degrees) RIGHT (7/10/24)   Knee Extension -20 dg -7 dg   Knee Flexion 82 dg 90 dg         ASSESSMENT   Progress Report Assessment:  Renetta Cope presents to physical therapy with continued significant stiffness and lack of gains in ROM beyond -6/-7 to 90 dg that will take her entire session to achieve. Pt is highly sensitized to pain in her knee and has been coached in breathing techniques and pain traffic light system. Pt has transitioned from a RW to a SPC in the past few weeks. Pt has been diligent in her home program emphasizing ROM. Pt is to f/u with surgeon

## 2024-07-18 ENCOUNTER — HOSPITAL ENCOUNTER (OUTPATIENT)
Dept: PHYSICAL THERAPY | Age: 74
Setting detail: RECURRING SERIES
Discharge: HOME | End: 2024-07-21
Payer: MEDICARE

## 2024-07-18 PROCEDURE — 97530 THERAPEUTIC ACTIVITIES: CPT

## 2024-07-18 PROCEDURE — 97110 THERAPEUTIC EXERCISES: CPT

## 2024-07-18 NOTE — PROGRESS NOTES
flexion 10 min incorporation on breathing            THERAPEUTIC ACTIVITY: ( 23 minutes):    Therapeutic activities per grid below to improve mobility, strength, coordination, and dynamic movement of upper body, lower body, and trunk to improve functional lifting, carrying, reaching, catching, and overhead activites.  Required minimal visual, verbal, manual, and tactile cues to promote coordination of bilateral, upper extremity(s), lower extremity(s) and promote motor control of bilateral, upper extremity(s), lower extremity(s).     Date:  6/14/24 Date:  6/17/24 Date:  6/24/24 Date:  7/1/24 Date:  7/10/24 Date:  7/18/24   Activity/Exercise Parameters Parameters Parameters      STS 20 \"  5 x 5 reps [place] citizen squats, red band, UEassisted  20\"  x 10   19\" x 5  18 \" x 10       RDL 10 lb x 10        DL 10 lb KB  2 x 5 reps   10 lb x 3  15 lb x 3 x 5 reps  15 lb x 3 x 8  Emphasis on eccentric load for increasing ROM   Sled   30 lb 2 x 30 ft  50 lb 3 x 30 ft push/pull 60 lb x 4 x 30 ft 80 lb x 4 x 30 ft 60 lb push/pull x 30 ft each  5 min warm up                                      HEP Log Date    see flowsheet above 6/10/2024   2.  prone knee extension 6/27/24   3. Deep asssisted squat to target,  Prone contract/relax to quad, Supine wall slides 7/3/24   4.     5.            Treatment/Session Summary:      Treatment Assessment:     Pt does well underbody weight today with assisted deep squats - achieves best knee flexion ROM @ 93 dg today. Pt is able to carry over equal WBing on B LE with STS during rest breaks on a 16\" surface.   Communication/Consultation:      None today   Equipment provided today: HEP see log above.    Recommendations/Intent for next  treatment session:  Next visit will focus on improving mobility, strength, pain science     >Total Treatment Billable Duration:  40 minutes 5 min rest  Time In: 1115  Time Out: 1200    Delia Holt PT         Charge Capture  Events  [employer]  Appt

## 2024-07-22 ENCOUNTER — HOSPITAL ENCOUNTER (OUTPATIENT)
Dept: PHYSICAL THERAPY | Age: 74
Setting detail: RECURRING SERIES
Discharge: HOME | End: 2024-07-25
Payer: MEDICARE

## 2024-07-22 PROCEDURE — 97530 THERAPEUTIC ACTIVITIES: CPT

## 2024-07-22 PROCEDURE — 97110 THERAPEUTIC EXERCISES: CPT

## 2024-07-22 NOTE — PROGRESS NOTES
Renetta Cope  : 1950  Primary: [employer] (Medicare)  Secondary: ANTHEM MEDICARE SUPP [facility] @ [place] [address] [address] [place] [address]  Phone: [phone]  Fax: [phone] Plan Frequency: 2x/wk for 12 weeks  Plan of Care/Certification Expiration Date: 24        Plan of Care/Certification Expiration Date:  Plan of Care/Certification Expiration Date: 24    Frequency/Duration: Plan Frequency: 2x/wk for 12 weeks      Time In/Out:   Time In: 1120  Time Out: 1205      PT Visit Info:         Visit Count:  11    OUTPATIENT PHYSICAL THERAPY:   Treatment Note 2024       Episode  (R TKA)               Treatment Diagnosis:    S/P total knee arthroplasty, right  Stiffness of right knee, not elsewhere classified  Right knee pain, unspecified chronicity  Muscle weakness (generalized)  Fear of falling  Difficulty in walking, not elsewhere classified  Medical/Referring Diagnosis:    Status post right knee replacement [Z96.651]    Referring Physician:  Mars Lawson MD MD Orders:  PT Eval and Treat   Return MD Appt:  24   Date of Onset:  Onset Date: 24     Allergies:   Levofloxacin, Gluten, and Morphine  Restrictions/Precautions:   Fear of falling      Interventions Planned (Treatment may consist of any combination of the following):     See Assessment Note    Subjective Comments:   Pt states she is doing ok but has been very stiff since last visit.  Feels like she can't do some of the exercises at all over the last couple days.     Initial Pain Level::     33/10  Post Session Pain Level:        /10  Medications Last Reviewed:  2024  Updated Objective Findings:   Knee flexion with assisted squat 93 dg knee extension -6 dg  Treatment     THERAPEUTIC EXERCISE: ( 20 minutes):    Exercises per grid below to improve mobility, strength, balance, and coordination. Required minimal visual, verbal, manual, and tactile cues to promote proper

## 2024-07-23 ENCOUNTER — OFFICE VISIT (OUTPATIENT)
Dept: ORTHOPEDIC SURGERY | Age: 74
End: 2024-07-23
Payer: MEDICARE

## 2024-07-23 ENCOUNTER — ANESTHESIA EVENT (OUTPATIENT)
Dept: SURGERY | Age: 74
End: 2024-07-23
Payer: MEDICARE

## 2024-07-23 DIAGNOSIS — Z96.659 STATUS POST KNEE REPLACEMENT, UNSPECIFIED LATERALITY: Primary | ICD-10-CM

## 2024-07-23 DIAGNOSIS — G89.18 POSTOPERATIVE PAIN: ICD-10-CM

## 2024-07-23 PROCEDURE — 1036F TOBACCO NON-USER: CPT | Performed by: ORTHOPAEDIC SURGERY

## 2024-07-23 PROCEDURE — 1123F ACP DISCUSS/DSCN MKR DOCD: CPT | Performed by: ORTHOPAEDIC SURGERY

## 2024-07-23 PROCEDURE — G8400 PT W/DXA NO RESULTS DOC: HCPCS | Performed by: ORTHOPAEDIC SURGERY

## 2024-07-23 PROCEDURE — 1090F PRES/ABSN URINE INCON ASSESS: CPT | Performed by: ORTHOPAEDIC SURGERY

## 2024-07-23 PROCEDURE — 3017F COLORECTAL CA SCREEN DOC REV: CPT | Performed by: ORTHOPAEDIC SURGERY

## 2024-07-23 PROCEDURE — G8427 DOCREV CUR MEDS BY ELIG CLIN: HCPCS | Performed by: ORTHOPAEDIC SURGERY

## 2024-07-23 PROCEDURE — G8417 CALC BMI ABV UP PARAM F/U: HCPCS | Performed by: ORTHOPAEDIC SURGERY

## 2024-07-23 PROCEDURE — 99214 OFFICE O/P EST MOD 30 MIN: CPT | Performed by: ORTHOPAEDIC SURGERY

## 2024-07-23 RX ORDER — HYDROCODONE BITARTRATE AND ACETAMINOPHEN 5; 325 MG/1; MG/1
1 TABLET ORAL EVERY 4 HOURS PRN
Qty: 40 TABLET | Refills: 0 | Status: SHIPPED | OUTPATIENT
Start: 2024-07-23 | End: 2024-07-30

## 2024-07-23 NOTE — PROGRESS NOTES
Patient ID:  Renetta Cope  [id number]  74 y.o.  1950    Today: July 23, 2024          CC:  Right Knee stiffness after arthroplasty    HPI:   The patient has stiffness of the right knee after arthroplasty. Patient was seen in office and range of motion was noted to be 0-90. At this time I feel physical therapy alone will not result in the range of motion the patient or I would hope for. We have discussed manipulation of the knee under sedation previously and the patient wishes to proceed. There have been no changes to the patient's orthopedic condition since the last office visit.    Past Medical History:  Past Medical History:   Diagnosis Date    Aortitis ([facility])     followed by vascular surgery    Arthritis     GERD (gastroesophageal reflux disease)     managed with meds prn    Post-nasal drip     Prolonged emergence from general anesthesia     PVD (peripheral vascular disease) ([facility])     Thyroid disease     Hypo    Vertigo        Past Surgical History:  Past Surgical History:   Procedure Laterality Date    APPENDECTOMY      COLONOSCOPY      HYSTERECTOMY (CERVIX STATUS UNKNOWN)      ORTHOPEDIC SURGERY      toe surgery, both hands    TOTAL KNEE ARTHROPLASTY Right 5/13/2024    Right KNEE TOTAL ARTHROPLASTY ROBOTIC, Rosie/SATISH/ SDD [place] Lawson [place] [facility]    UROLOGICAL SURGERY      bladder         Medications:     Prior to Admission medications    Medication Sig Start Date End Date Taking? Authorizing Provider   gabapentin (NEURONTIN) 100 MG capsule Take 1 capsule by mouth 2 times daily for 15 days. 7/8/24 7/23/24  Mars Lawson MD   gabapentin (NEURONTIN) 100 MG capsule Take 1 capsule by mouth 2 times daily for 15 days. 6/24/24 7/9/24  Mars Lawson MD   gabapentin (NEURONTIN) 300 MG capsule Take 1 capsule by mouth 2 times daily for 15 days. 6/20/24 7/5/24  Mars Lawson MD   amoxicillin (AMOXIL) 500 MG tablet Take 4 tablets by mouth 1 hour prior to any dental procedure.

## 2024-07-24 ENCOUNTER — ANESTHESIA (OUTPATIENT)
Dept: SURGERY | Age: 74
End: 2024-07-24
Payer: MEDICARE

## 2024-07-24 ENCOUNTER — HOSPITAL ENCOUNTER (OUTPATIENT)
Dept: SURGERY | Age: 74
Setting detail: OUTPATIENT SURGERY
Discharge: HOME | End: 2024-07-27
Payer: MEDICARE

## 2024-07-24 ENCOUNTER — HOSPITAL ENCOUNTER (OUTPATIENT)
Dept: PHYSICAL THERAPY | Age: 74
Setting detail: RECURRING SERIES
Discharge: HOME | End: 2024-07-27
Payer: MEDICARE

## 2024-07-24 VITALS
RESPIRATION RATE: 17 BRPM | BODY MASS INDEX: 26.39 KG/M2 | HEART RATE: 74 BPM | SYSTOLIC BLOOD PRESSURE: 144 MMHG | WEIGHT: 139.8 LBS | TEMPERATURE: 98 F | DIASTOLIC BLOOD PRESSURE: 81 MMHG | OXYGEN SATURATION: 95 % | HEIGHT: 61 IN

## 2024-07-24 PROCEDURE — 2500000003 HC RX 250 WO HCPCS: Performed by: NURSE ANESTHETIST, CERTIFIED REGISTERED

## 2024-07-24 PROCEDURE — 2580000003 HC RX 258: Performed by: ANESTHESIOLOGY

## 2024-07-24 PROCEDURE — 3700000000 HC ANESTHESIA ATTENDED CARE: Performed by: ANESTHESIOLOGY

## 2024-07-24 PROCEDURE — 6360000002 HC RX W HCPCS: Performed by: ANESTHESIOLOGY

## 2024-07-24 PROCEDURE — 3700000001 HC ADD 15 MINUTES (ANESTHESIA): Performed by: ANESTHESIOLOGY

## 2024-07-24 PROCEDURE — 64447 NJX AA&/STRD FEMORAL NRV IMG: CPT | Performed by: ANESTHESIOLOGY

## 2024-07-24 PROCEDURE — 97110 THERAPEUTIC EXERCISES: CPT

## 2024-07-24 PROCEDURE — 7100000011 HC PHASE II RECOVERY - ADDTL 15 MIN: Performed by: ANESTHESIOLOGY

## 2024-07-24 PROCEDURE — 6360000002 HC RX W HCPCS: Performed by: NURSE ANESTHETIST, CERTIFIED REGISTERED

## 2024-07-24 PROCEDURE — 6370000000 HC RX 637 (ALT 250 FOR IP): Performed by: ANESTHESIOLOGY

## 2024-07-24 PROCEDURE — 7100000010 HC PHASE II RECOVERY - FIRST 15 MIN: Performed by: ANESTHESIOLOGY

## 2024-07-24 PROCEDURE — 27570 FIXATION OF KNEE JOINT: CPT | Performed by: ANESTHESIOLOGY

## 2024-07-24 RX ORDER — PROCHLORPERAZINE EDISYLATE 5 MG/ML
5 INJECTION INTRAMUSCULAR; INTRAVENOUS
Status: ACTIVE | OUTPATIENT
Start: 2024-07-24 | End: 2024-07-25

## 2024-07-24 RX ORDER — SODIUM CHLORIDE 0.9 % (FLUSH) 0.9 %
5-40 SYRINGE (ML) INJECTION PRN
Status: DISCONTINUED | OUTPATIENT
Start: 2024-07-24 | End: 2024-07-28 | Stop reason: HOSPADM

## 2024-07-24 RX ORDER — OXYCODONE HYDROCHLORIDE 5 MG/1
5 TABLET ORAL
Status: COMPLETED | OUTPATIENT
Start: 2024-07-24 | End: 2024-07-24

## 2024-07-24 RX ORDER — FENTANYL CITRATE 50 UG/ML
100 INJECTION, SOLUTION INTRAMUSCULAR; INTRAVENOUS
Status: COMPLETED | OUTPATIENT
Start: 2024-07-24 | End: 2024-07-24

## 2024-07-24 RX ORDER — DEXAMETHASONE SODIUM PHOSPHATE 10 MG/ML
INJECTION, SOLUTION INTRAMUSCULAR; INTRAVENOUS
Status: COMPLETED | OUTPATIENT
Start: 2024-07-24 | End: 2024-07-24

## 2024-07-24 RX ORDER — PROPOFOL 10 MG/ML
INJECTION, EMULSION INTRAVENOUS PRN
Status: DISCONTINUED | OUTPATIENT
Start: 2024-07-24 | End: 2024-07-24 | Stop reason: SDUPTHER

## 2024-07-24 RX ORDER — MIDAZOLAM HYDROCHLORIDE 2 MG/2ML
2 INJECTION, SOLUTION INTRAMUSCULAR; INTRAVENOUS
Status: COMPLETED | OUTPATIENT
Start: 2024-07-24 | End: 2024-07-24

## 2024-07-24 RX ORDER — LIDOCAINE HYDROCHLORIDE 20 MG/ML
INJECTION, SOLUTION EPIDURAL; INFILTRATION; INTRACAUDAL; PERINEURAL PRN
Status: DISCONTINUED | OUTPATIENT
Start: 2024-07-24 | End: 2024-07-24 | Stop reason: SDUPTHER

## 2024-07-24 RX ORDER — NALOXONE HYDROCHLORIDE 0.4 MG/ML
INJECTION, SOLUTION INTRAMUSCULAR; INTRAVENOUS; SUBCUTANEOUS PRN
Status: DISCONTINUED | OUTPATIENT
Start: 2024-07-24 | End: 2024-07-28 | Stop reason: HOSPADM

## 2024-07-24 RX ORDER — SODIUM CHLORIDE, SODIUM LACTATE, POTASSIUM CHLORIDE, CALCIUM CHLORIDE 600; 310; 30; 20 MG/100ML; MG/100ML; MG/100ML; MG/100ML
INJECTION, SOLUTION INTRAVENOUS CONTINUOUS
Status: DISCONTINUED | OUTPATIENT
Start: 2024-07-24 | End: 2024-07-28 | Stop reason: HOSPADM

## 2024-07-24 RX ORDER — SODIUM CHLORIDE 0.9 % (FLUSH) 0.9 %
5-40 SYRINGE (ML) INJECTION EVERY 12 HOURS SCHEDULED
Status: DISCONTINUED | OUTPATIENT
Start: 2024-07-24 | End: 2024-07-28 | Stop reason: HOSPADM

## 2024-07-24 RX ORDER — SODIUM CHLORIDE 9 MG/ML
INJECTION, SOLUTION INTRAVENOUS PRN
Status: DISCONTINUED | OUTPATIENT
Start: 2024-07-24 | End: 2024-07-28 | Stop reason: HOSPADM

## 2024-07-24 RX ORDER — LIDOCAINE HYDROCHLORIDE 10 MG/ML
1 INJECTION, SOLUTION INFILTRATION; PERINEURAL
Status: ACTIVE | OUTPATIENT
Start: 2024-07-24 | End: 2024-07-25

## 2024-07-24 RX ADMIN — SODIUM CHLORIDE, POTASSIUM CHLORIDE, SODIUM LACTATE AND CALCIUM CHLORIDE: 600; 310; 30; 20 INJECTION, SOLUTION INTRAVENOUS at 05:53

## 2024-07-24 RX ADMIN — FENTANYL CITRATE 50 MCG: 50 INJECTION INTRAMUSCULAR; INTRAVENOUS at 06:21

## 2024-07-24 RX ADMIN — FENTANYL CITRATE 50 MCG: 50 INJECTION INTRAMUSCULAR; INTRAVENOUS at 06:22

## 2024-07-24 RX ADMIN — PROPOFOL 80 MG: 10 INJECTION, EMULSION INTRAVENOUS at 06:59

## 2024-07-24 RX ADMIN — OXYCODONE HYDROCHLORIDE 5 MG: 5 TABLET ORAL at 07:42

## 2024-07-24 RX ADMIN — ROPIVACAINE HYDROCHLORIDE 10 ML: 2 INJECTION, SOLUTION EPIDURAL; INFILTRATION at 06:20

## 2024-07-24 RX ADMIN — MIDAZOLAM 2 MG: 1 INJECTION INTRAMUSCULAR; INTRAVENOUS at 06:21

## 2024-07-24 RX ADMIN — DEXAMETHASONE SODIUM PHOSPHATE 4 MG: 10 INJECTION, SOLUTION INTRAMUSCULAR; INTRAVENOUS at 06:20

## 2024-07-24 RX ADMIN — LIDOCAINE HYDROCHLORIDE 50 MG: 20 INJECTION, SOLUTION EPIDURAL; INFILTRATION; INTRACAUDAL; PERINEURAL at 06:59

## 2024-07-24 NOTE — ANESTHESIA PRE PROCEDURE
use: Not Currently                                Counseling given: Not Answered      Vital Signs (Current):   Vitals:    07/24/24 0529 07/24/24 0606   BP: (!) 163/79 (!) 188/77   Pulse: 89 82   Resp: 16 16   Temp: 98.2 °F (36.8 °C)    TempSrc: Temporal    SpO2: 99% 98%   Weight: 63.4 kg (139 lb 12.8 oz)    Height: 1.549 m (5' 1\")                                               BP Readings from Last 3 Encounters:   07/24/24 (!) 188/77   05/13/24 126/72   04/22/24 (!) 161/77       NPO Status: Time of last liquid consumption: 2100                        Time of last solid consumption: 2100                        Date of last liquid consumption: 07/23/24                        Date of last solid food consumption: 07/23/24    BMI:   Wt Readings from Last 3 Encounters:   07/24/24 63.4 kg (139 lb 12.8 oz)   05/13/24 63.5 kg (140 lb)   04/22/24 64.1 kg (141 lb 5 oz)     Body mass index is 26.41 kg/m².    CBC:   Lab Results   Component Value Date/Time    WBC 5.7 04/22/2024 09:21 AM    RBC 4.42 04/22/2024 09:21 AM    HGB 12.8 04/22/2024 09:21 AM    HCT 39.9 04/22/2024 09:21 AM    MCV 90.3 04/22/2024 09:21 AM    RDW 13.1 04/22/2024 09:21 AM     04/22/2024 09:21 AM       CMP:   Lab Results   Component Value Date/Time     04/22/2024 09:21 AM    K 4.1 04/22/2024 09:21 AM     04/22/2024 09:21 AM    CO2 28 04/22/2024 09:21 AM    BUN 12 04/22/2024 09:21 AM    CREATININE 0.59 04/22/2024 09:21 AM    GFRAA >60 04/16/2020 06:46 AM    LABGLOM >90 04/22/2024 09:21 AM    GLUCOSE 100 04/22/2024 09:21 AM    CALCIUM 9.6 04/22/2024 09:21 AM    BILITOT 0.4 05/14/2023 11:03 AM    ALKPHOS 86 05/14/2023 11:03 AM    AST 13 05/14/2023 11:03 AM    ALT 24 05/14/2023 11:03 AM       POC Tests: No results for input(s): \"POCGLU\", \"POCNA\", \"POCK\", \"POCCL\", \"POCBUN\", \"POCHEMO\", \"POCHCT\" in the last 72 hours.    Coags:   Lab Results   Component Value Date/Time    PROTIME 13.4 04/22/2024 09:21 AM    INR 1.1 04/22/2024 09:21 AM    APTT 31.2

## 2024-07-24 NOTE — ANESTHESIA PROCEDURE NOTES
Peripheral Block    Patient location during procedure: pre-op  Reason for block: post-op pain management and at surgeon's request  Start time: 7/24/2024 6:20 AM  End time: 7/24/2024 6:23 AM  Staffing  Performed: anesthesiologist   Anesthesiologist: Bib Blanc MD  Performed by: Bib Blanc MD  Authorized by: Bib Blanc MD    Preanesthetic Checklist  Completed: patient identified, IV checked, site marked, risks and benefits discussed, surgical/procedural consents, equipment checked, pre-op evaluation, timeout performed, anesthesia consent given, oxygen available and monitors applied/VS acknowledged  Peripheral Block   Patient position: supine  Prep: ChloraPrep  Provider prep: mask and sterile gloves  Patient monitoring: cardiac monitor, continuous pulse ox, frequent blood pressure checks, IV access, oxygen and responsive to questions  Block type: Femoral  Adductor canal  Laterality: right  Injection technique: single-shot  Guidance: ultrasound guided    Needle   Needle type: insulated echogenic nerve stimulator needle   Needle gauge: 20 G  Needle localization: ultrasound guidance  Needle length: 10 cm  Assessment   Injection assessment: negative aspiration for heme, no paresthesia on injection, no intravascular symptoms and local visualized surrounding nerve on ultrasound  Slow fractionated injection: yes  Hemodynamics: stable  Outcomes: patient tolerated procedure well and uncomplicated    Additional Notes  3 cc 1% lidocaine local at needle insertion site.  Risks/benefits/alternatives discussed including damage to muscle or nerve, bleeding, infection, and a reaction to our medications.  Needle inserted and placed in close proximity to the nerve under real time ultrasound guidance.  Ultrasound was used to visualize the spread of local anesthetic in close proximity to the nerve being blocked.  All structures appeared anatomically normal and there were no abnormal findings.  Ultrasound

## 2024-07-24 NOTE — PROGRESS NOTES
Hip at 90 deg   Knee F/E x 10     +MET CR ham/quad               shuttle 10 min incorporation on breathing              THERAPEUTIC ACTIVITY: (  minutes):    Therapeutic activities per grid below to improve mobility, strength, coordination, and dynamic movement of upper body, lower body, and trunk to improve functional lifting, carrying, reaching, catching, and overhead activites.  Required minimal visual, verbal, manual, and tactile cues to promote coordination of bilateral, upper extremity(s), lower extremity(s) and promote motor control of bilateral, upper extremity(s), lower extremity(s).     Date:  6/14/24 Date:  6/17/24 Date:  6/24/24 Date:  7/1/24 Date:  7/10/24 Date:  7/18/24 7/22/24   Activity/Exercise Parameters Parameters Parameters       STS 20 \"  5 x 5 reps [place] squats, red band, UEassisted  20\"  x 10   19\" x 5  18 \" x 10        RDL 10 lb x 10         DL 10 lb KB  2 x 5 reps   10 lb x 3  15 lb x 3 x 5 reps  15 lb x 3 x 8  Emphasis on eccentric load for increasing ROM    Sled   30 lb 2 x 30 ft  50 lb 3 x 30 ft push/pull 60 lb x 4 x 30 ft 80 lb x 4 x 30 ft 60 lb push/pull x 30 ft each  5 min warm up   70# push/pull 3 x 30 ft each  (Cues for alinement)      Guaman carry        10# dbs   3 x 80 ft    Gait activity        Level ground 6 x 30 ft   Vc: \"dont spill glasses of water in hands\"                   HEP Log Date    see flowsheet above 6/10/2024   2.  prone knee extension 6/27/24   3. Deep asssisted squat to target,  Prone contract/relax to quad, Supine wall slides 7/3/24   4.guaman carry      5.        Ice application to right knee post treatment for edema control (combined with TKE positioning) x 8 min     Treatment/Session Summary:      Treatment Assessment:     Focus on education, ROM and HEP instruction following LENNIE. Discussed importance of edema control, HEP consistency, and reaching ROM goal using relaxation techniques/breathing to decrease muscle guarding . Pt tolerated above very

## 2024-07-24 NOTE — DISCHARGE INSTRUCTIONS
Knee manipulation is a procedure to treat knee stiffness and decreased range of motion. After trauma or knee surgery, scar tissue can form in your joint. The scar tissue does not allow you to fully bend or straighten your leg. Knee manipulation breaks up the scar tissue that has formed.    DISCHARGE INSTRUCTIONS:    Seek care immediately if:  You cannot move your knee.  Your pain and swelling suddenly become worse.  You have numbness or tingling in your leg or foot.    Contact your healthcare provider if:  Your pain does not get better after you take pain medicine.  You have questions or concerns about your condition or care.    Medicines:  Prescription pain medicine may be given. Ask your healthcare provider how to take this medicine safely.  Take your medicine as directed. Contact your healthcare provider if you think your medicine is not helping or if you have side effects.    Self-care:  Apply ice on your knee for 15 to 20 minutes every hour or as directed.     -Ice helps prevent tissue damage and decreases swelling and pain.    - Use an ice pack, or put crushed ice in a plastic bag. Cover it with a towel before you apply it to your skin.  Elevate your knee above the level of your heart as often as you can. This will help decrease swelling and pain.     -If possible, prop your knee on pillows or blankets to keep it elevated comfortably.  Do your knee exercises as directed. You will be taught exercises to help strengthen your knee.     -Exercises will also prevent stiffness and increase your range of motion.     Physical therapy:  You may start physical therapy the day of your procedure.    - A physical therapist teaches you exercises to help improve movement and strength, and to decrease pain.     -Take pain medicine 1 hour before you go to physical therapy. This will make it easier to exercise your knee.      After general anesthesia or intravenous sedation, for 24 hours or while taking prescription

## 2024-07-24 NOTE — ANESTHESIA POSTPROCEDURE EVALUATION
Department of Anesthesiology  Postprocedure Note    Patient: Renetta Cope  MRN: [id number]  YOB: 1950  Date of evaluation: 7/24/2024    Procedure Summary       Date: 07/24/24 [place] [facility] [place]    Anesthesia Start: 0649 Anesthesia Stop: 0706    Procedure: JOINT MANIPULATION Diagnosis:     Scheduled Providers: Bib Blanc MD Responsible Provider: All Weaver MD    Anesthesia Type: TIVA ASA Status: 3            Anesthesia Type: No value filed.    Kylie Phase I: Kylie Score: 8    Kylie Phase II: Kylie Score: 8    Anesthesia Post Evaluation    Patient location during evaluation: PACU  Patient participation: complete - patient participated  Level of consciousness: awake and awake and alert  Airway patency: patent  Nausea & Vomiting: no nausea  Cardiovascular status: hemodynamically stable  Respiratory status: acceptable  Hydration status: euvolemic  Multimodal analgesia pain management approach  Pain management: adequate    No notable events documented.

## 2024-07-24 NOTE — OP NOTE
[facility]  Closed Manipulation of Total Knee Arthroplasty    Patient:Renetta Cope   : 1950  Medical Record Number:[id number]  Pre-operative Diagnosis:  * No pre-op diagnosis entered *  Post-operative Diagnosis: * No post-op diagnosis entered *  Procedure: Manipulation of right knee under conscious sedation  Location: [facility]  Surgeon: Mars Lawson MD  Anesthesia: Adductor Canal Block + conscious sedation  EBL: none  Antibiotics: None    Description of Procedure: The patient was brought to the PACU. An adductor canal nerve block was administered prior to procedure. A timeout was conducted and documented by nursing. No antibiotics were given prior to procedure. The patient the received conscious sedation administered by anesthesia.     Pre-procedureROM was noted to be 0-90 degrees.     A closed manipulation was performed on the knee. Upon completion the ROM was 0-125 degrees. Ligaments were stable. Extensor mechanism was intact. Ice was applied to the knee. The patient tolerated the procedure w/o difficulty. Patient will be discharged post-procedure and proceed to PT later today.      Signed By: Mars Lawson MD  2024,  7:08 AM    I have reviewed the patient’s controlled substance prescription history, as maintained in the [place] prescription monitoring program, so that the prescription(s) for a  controlled substance can be given.

## 2024-07-25 ENCOUNTER — HOSPITAL ENCOUNTER (OUTPATIENT)
Dept: PHYSICAL THERAPY | Age: 74
Setting detail: RECURRING SERIES
Discharge: HOME | End: 2024-07-28
Payer: MEDICARE

## 2024-07-25 PROCEDURE — 97110 THERAPEUTIC EXERCISES: CPT

## 2024-07-25 ASSESSMENT — PAIN SCALES - GENERAL: PAINLEVEL_OUTOF10: 5

## 2024-07-25 NOTE — PROGRESS NOTES
Renetta Cope  : 1950  Primary: [employer] (Medicare)  Secondary: ANTHEM MEDICARE SUPP [facility] @ [place] [address] [place] [address]  Phone: [phone]  Fax: [phone] Plan Frequency: 2x/wk for 12 weeks  Plan of Care/Certification Expiration Date: 24        Plan of Care/Certification Expiration Date:  Plan of Care/Certification Expiration Date: 24    Frequency/Duration: Plan Frequency: 2x/wk for 12 weeks      Time In/Out:   Time In: 1110  Time Out: 1200      PT Visit Info:         Visit Count:  13    OUTPATIENT PHYSICAL THERAPY:   Treatment Note 2024       Episode  (R TKA)               Treatment Diagnosis:    S/P total knee arthroplasty, right  Stiffness of right knee, not elsewhere classified  Right knee pain, unspecified chronicity  Muscle weakness (generalized)  Fear of falling  Difficulty in walking, not elsewhere classified  Medical/Referring Diagnosis:    Status post right knee replacement [Z96.651]    Referring Physician:  Mars Lawson MD MD Orders:  PT Eval and Treat   Return MD Appt:  24   Date of Onset:  Onset Date: 24     Allergies:   Levofloxacin, Gluten, and Morphine  Restrictions/Precautions:   Fear of falling      Interventions Planned (Treatment may consist of any combination of the following):     See Assessment Note    Subjective Comments:   Arrives with RW.   Pt states she got good sleep last night but was really sore and painful when first got up. Immediately began doing exercise this AM to decrease stiffness.     Initial Pain Level::     5/10  Post Session Pain Level:        /10  Medications Last Reviewed:  2024  Updated Objective Findings:   Knee flexion 102 dg, knee extension 0 dg  Treatment     THERAPEUTIC EXERCISE: ( 45 minutes):    Exercises per grid below to improve mobility, strength, balance, and coordination. Required minimal visual, verbal, manual, and tactile cues to promote

## 2024-07-30 ENCOUNTER — HOSPITAL ENCOUNTER (OUTPATIENT)
Dept: PHYSICAL THERAPY | Age: 74
Setting detail: RECURRING SERIES
Discharge: HOME | End: 2024-08-02
Payer: MEDICARE

## 2024-07-30 PROCEDURE — 97110 THERAPEUTIC EXERCISES: CPT

## 2024-07-30 PROCEDURE — 97530 THERAPEUTIC ACTIVITIES: CPT

## 2024-07-30 RX ORDER — MELOXICAM 15 MG/1
15 TABLET ORAL DAILY
Qty: 30 TABLET | Refills: 0 | OUTPATIENT
Start: 2024-07-30

## 2024-07-30 NOTE — PROGRESS NOTES
Renetta Cope  : 1950  Primary: Medicare Part A [employer] B (Medicare)  Secondary: ANTHEM MEDICARE SUPP [facility] @ [place] [address] [place] [address]  Phone: [phone]  Fax: [phone] Plan Frequency: 2x/wk for 12 weeks  Plan of Care/Certification Expiration Date: 24        Plan of Care/Certification Expiration Date:  Plan of Care/Certification Expiration Date: 24    Frequency/Duration: Plan Frequency: 2x/wk for 12 weeks      Time In/Out:   Time In: 1115  Time Out: 1200      PT Visit Info:         Visit Count:  14    OUTPATIENT PHYSICAL THERAPY:   Treatment Note 2024       Episode  (R TKA)               Treatment Diagnosis:    S/P total knee arthroplasty, right  Stiffness of right knee, not elsewhere classified  Right knee pain, unspecified chronicity  Muscle weakness (generalized)  Fear of falling  Difficulty in walking, not elsewhere classified  Medical/Referring Diagnosis:    Status post right knee replacement [Z96.651]    Referring Physician:  Mars Lawson MD MD Orders:  PT Eval and Treat   Return MD Appt:  24   Date of Onset:  Onset Date: 24     Allergies:   Levofloxacin, Gluten, and Morphine  Restrictions/Precautions:   Fear of falling      Interventions Planned (Treatment may consist of any combination of the following):     See Assessment Note    Subjective Comments:   Arrives with RW.   Pt states she has been walking around the house  without AD.     Initial Pain Level::      /10  Post Session Pain Level:        /10  Medications Last Reviewed:  2024  Updated Objective Findings:   Knee flexion 100 dg, knee extension -7 dg  Treatment     THERAPEUTIC EXERCISE: ( 25 minutes):    Exercises per grid below to improve mobility, strength, balance, and coordination. Required minimal visual, verbal, manual, and tactile cues to promote proper body mechanics.  Progressed resistance and repetitions as indicated.

## 2024-08-01 ENCOUNTER — HOSPITAL ENCOUNTER (OUTPATIENT)
Dept: PHYSICAL THERAPY | Age: 74
Setting detail: RECURRING SERIES
Discharge: HOME | End: 2024-08-04
Payer: MEDICARE

## 2024-08-01 PROCEDURE — 97530 THERAPEUTIC ACTIVITIES: CPT

## 2024-08-01 PROCEDURE — 97110 THERAPEUTIC EXERCISES: CPT

## 2024-08-01 NOTE — PROGRESS NOTES
Renetta Cope  : 1950  Primary: Medicare Part A [employer] B (Medicare)  Secondary: ANTHEM MEDICARE SUPP [facility] @ [place] [address] [address] [place] [address]  Phone: [phone]  Fax: [phone] Plan Frequency: 2x/wk for 12 weeks  Plan of Care/Certification Expiration Date: 24        Plan of Care/Certification Expiration Date:  Plan of Care/Certification Expiration Date: 24    Frequency/Duration: Plan Frequency: 2x/wk for 12 weeks      Time In/Out:   Time In: 1300  Time Out: 1345      PT Visit Info:         Visit Count:  15    OUTPATIENT PHYSICAL THERAPY:   Treatment Note 2024       Episode  (R TKA)               Treatment Diagnosis:    S/P total knee arthroplasty, right  Stiffness of right knee, not elsewhere classified  Right knee pain, unspecified chronicity  Muscle weakness (generalized)  Fear of falling  Difficulty in walking, not elsewhere classified  Medical/Referring Diagnosis:    Status post right knee replacement [Z96.651]    Referring Physician:  Mars Lawson MD MD Orders:  PT Eval and Treat   Return MD Appt:  24   Date of Onset:  Onset Date: 24     Allergies:   Levofloxacin, Gluten, and Morphine  Restrictions/Precautions:   Fear of falling      Interventions Planned (Treatment may consist of any combination of the following):     See Assessment Note    Subjective Comments:   Arrives without AD today.    Initial Pain Level::   ache   /10  Post Session Pain Level:     ache   /10  Medications Last Reviewed:  2024  Updated Objective Findings:   Knee flexion AROM 95 dg AAROM 102 dg, AROM knee extension  -7  dg  Treatment     THERAPEUTIC EXERCISE: ( 15 minutes):    Exercises per grid below to improve mobility, strength, balance, and coordination. Required minimal visual, verbal, manual, and tactile cues to promote proper body mechanics.  Progressed resistance and repetitions as indicated.     Date:  24 Date:  7/3/24

## 2024-08-06 ENCOUNTER — HOSPITAL ENCOUNTER (OUTPATIENT)
Dept: PHYSICAL THERAPY | Age: 74
Setting detail: RECURRING SERIES
Discharge: HOME | End: 2024-08-09
Payer: MEDICARE

## 2024-08-06 PROCEDURE — 97530 THERAPEUTIC ACTIVITIES: CPT

## 2024-08-06 PROCEDURE — 97110 THERAPEUTIC EXERCISES: CPT

## 2024-08-06 NOTE — PROGRESS NOTES
Renetta Cope  : 1950  Primary: Medicare Part A [employer] B (Medicare)  Secondary: ANTHEM MEDICARE SUPP [facility] @ [place] [address] [place] [address]  Phone: [phone]  Fax: [phone] Plan Frequency: 2x/wk for 12 weeks  Plan of Care/Certification Expiration Date: 24        Plan of Care/Certification Expiration Date:  Plan of Care/Certification Expiration Date: 24    Frequency/Duration: Plan Frequency: 2x/wk for 12 weeks      Time In/Out:   Time In: 1115  Time Out: 1200      PT Visit Info:         Visit Count:  16    OUTPATIENT PHYSICAL THERAPY:   Treatment Note 2024       Episode  (R TKA)               Treatment Diagnosis:    S/P total knee arthroplasty, right  Stiffness of right knee, not elsewhere classified  Right knee pain, unspecified chronicity  Muscle weakness (generalized)  Fear of falling  Difficulty in walking, not elsewhere classified  Medical/Referring Diagnosis:    Status post right knee replacement [Z96.651]    Referring Physician:  Mars Lawson MD MD Orders:  PT Eval and Treat   Return MD Appt:  24   Date of Onset:  Onset Date: 24     Allergies:   Levofloxacin, Gluten, and Morphine  Restrictions/Precautions:   Fear of falling      Interventions Planned (Treatment may consist of any combination of the following):     See Assessment Note    Subjective Comments:   Arrives without AD today. Pt reporting she has been out in the garden working and it has felt good.     Initial Pain Level::   ache   /10  Post Session Pain Level:     ache   /10  Medications Last Reviewed:  2024  Updated Objective Findings:   Knee flexion AROM 95 dg AAROM 102 dg, AROM knee extension  -7  dg  Treatment     THERAPEUTIC EXERCISE: ( 15 minutes):    Exercises per grid below to improve mobility, strength, balance, and coordination. Required minimal visual, verbal, manual, and tactile cues to promote proper body mechanics.  Progressed

## 2024-08-08 ENCOUNTER — HOSPITAL ENCOUNTER (OUTPATIENT)
Dept: PHYSICAL THERAPY | Age: 74
Setting detail: RECURRING SERIES
Discharge: HOME | End: 2024-08-11
Payer: MEDICARE

## 2024-08-08 PROCEDURE — 97110 THERAPEUTIC EXERCISES: CPT

## 2024-08-08 PROCEDURE — 97530 THERAPEUTIC ACTIVITIES: CPT

## 2024-08-08 NOTE — PROGRESS NOTES
Renetta Cope  : 1950  Primary: Medicare Part A [employer] B (Medicare)  Secondary: ANTHEM MEDICARE SUPP [facility] @ [place] [address] [address] [place] [address]  Phone: [phone]  Fax: [phone] Plan Frequency: 2x/wk for 12 weeks  Plan of Care/Certification Expiration Date: 24        Plan of Care/Certification Expiration Date:  Plan of Care/Certification Expiration Date: 24    Frequency/Duration: Plan Frequency: 2x/wk for 12 weeks      Time In/Out:   Time In: 1115  Time Out: 1200      PT Visit Info:         Visit Count:  17    OUTPATIENT PHYSICAL THERAPY:   Treatment Note 2024       Episode  (R TKA)               Treatment Diagnosis:    S/P total knee arthroplasty, right  Stiffness of right knee, not elsewhere classified  Right knee pain, unspecified chronicity  Muscle weakness (generalized)  Fear of falling  Difficulty in walking, not elsewhere classified  Medical/Referring Diagnosis:    Status post right knee replacement [Z96.651]    Referring Physician:  Mars Lawson MD MD Orders:  PT Eval and Treat   Return MD Appt:  24   Date of Onset:  Onset Date: 24     Allergies:   Levofloxacin, Gluten, and Morphine  Restrictions/Precautions:   Fear of falling      Interventions Planned (Treatment may consist of any combination of the following):     See Assessment Note    Subjective Comments:   Arrives without AD today. Pt reporting she has been out in the garden working and it has felt good.     Initial Pain Level::   ache   /10  Post Session Pain Level:     ache   /10  Medications Last Reviewed:  2024  Updated Objective Findings:   Knee flexion AROM 95 dg AAROM 102 dg, AROM knee extension  -7  dg  Treatment     THERAPEUTIC EXERCISE: ( 17 minutes):    Exercises per grid below to improve mobility, strength, balance, and coordination. Required minimal visual, verbal, manual, and tactile cues to promote proper body mechanics.  Progressed

## 2024-08-13 ENCOUNTER — TELEPHONE (OUTPATIENT)
Dept: ORTHOPEDIC SURGERY | Age: 74
End: 2024-08-13

## 2024-08-13 ENCOUNTER — HOSPITAL ENCOUNTER (OUTPATIENT)
Dept: PHYSICAL THERAPY | Age: 74
Setting detail: RECURRING SERIES
Discharge: HOME | End: 2024-08-16
Payer: MEDICARE

## 2024-08-13 DIAGNOSIS — M25.569 KNEE PAIN, UNSPECIFIED CHRONICITY, UNSPECIFIED LATERALITY: Primary | ICD-10-CM

## 2024-08-13 PROCEDURE — 97530 THERAPEUTIC ACTIVITIES: CPT

## 2024-08-13 PROCEDURE — 97110 THERAPEUTIC EXERCISES: CPT

## 2024-08-13 RX ORDER — TRAMADOL HYDROCHLORIDE 50 MG/1
50 TABLET ORAL EVERY 4 HOURS PRN
Qty: 42 TABLET | Refills: 0 | Status: SHIPPED | OUTPATIENT
Start: 2024-08-13 | End: 2024-08-20

## 2024-08-13 NOTE — TELEPHONE ENCOUNTER
She is requesting a refill on hydrocodone 5/325. She takes it on therapy days and just when needed, She still has a good bit of pain. She does use [facility] in [place] on [address].   She had a knee manipulation July 24. She says no one told her when to come back in. Can you let her know this as well.

## 2024-08-13 NOTE — PROGRESS NOTES
Renetta Cope  : 1950  Primary: Medicare Part A [employer] B (Medicare)  Secondary: AN[place]M MEDICARE SUPP [facility] @ [place] [address] [address] [place] [address]  Phone: [phone]  Fax: [phone] Plan Frequency: 2x/wk for 12 weeks  Plan of Care/Certification Expiration Date: 24        Plan of Care/Certification Expiration Date:  Plan of Care/Certification Expiration Date: 24    Frequency/Duration: Plan Frequency: 2x/wk for 12 weeks      Time In/Out:   Time In: 1115  Time Out: 1200      PT Visit Info:         Visit Count:  18    OUTPATIENT PHYSICAL THERAPY:   Treatment Note 2024       Episode  (R TKA)               Treatment Diagnosis:    S/P total knee arthroplasty, right  Stiffness of right knee, not elsewhere classified  Right knee pain, unspecified chronicity  Muscle weakness (generalized)  Fear of falling  Difficulty in walking, not elsewhere classified  Medical/Referring Diagnosis:    Status post right knee replacement [Z96.651]    Referring Physician:  Mars Lawson MD MD Orders:  PT Eval and Treat   Return MD Appt:  24   Date of Onset:  Onset Date: 24     Allergies:   Levofloxacin, Gluten, and Morphine  Restrictions/Precautions:   Fear of falling      Interventions Planned (Treatment may consist of any combination of the following):     See Assessment Note    Subjective Comments:   Arrives without AD today. Pt reporting she has been out in the garden working and it has felt good.     Initial Pain Level::   ache   /10  Post Session Pain Level:     ache   /10  Medications Last Reviewed:  2024  Updated Objective Findings:   Knee flexion AROM 95 dg AAROM 102 dg, AROM knee extension  -7  dg  Treatment     THERAPEUTIC EXERCISE: ( 17 minutes):    Exercises per grid below to improve mobility, strength, balance, and coordination. Required minimal visual, verbal, manual, and tactile cues to promote proper body mechanics.  Progressed

## 2024-08-15 ENCOUNTER — HOSPITAL ENCOUNTER (OUTPATIENT)
Dept: PHYSICAL THERAPY | Age: 74
Setting detail: RECURRING SERIES
Discharge: HOME | End: 2024-08-18
Payer: MEDICARE

## 2024-08-15 PROCEDURE — 97110 THERAPEUTIC EXERCISES: CPT

## 2024-08-15 PROCEDURE — 97530 THERAPEUTIC ACTIVITIES: CPT

## 2024-08-15 NOTE — PROGRESS NOTES
Date:  8/1/24 Date:  8/6/24 Date:  8/8/24 Date:  8/13/24 Date:  8/15/24   Activity/Exercise         Education      Assessment of progress toward goals - see re-cert for details.   Nustep SciFit, hills 3, 8 min, 44 calories 8 min, lvl 6   34 calories Hill function lvl 4, 8 min, 55 calories, HR 97 -98 Hill function lvl 4, 8 min, 55 calories, HR 97 -98 Random Hill function lvl 5, 8 min, 51 calories 8 min, lvl 5, 10:50 sec, fast: steady      Warm up  Stair stretch w/ Hamstring swoop x 10   10 lb KB RDL x 10 Stair stretch w/ Hamstring swoop x 10   Heel raise on wedge <-> deep squat x 10 L stretch x 10  Hamstring swoop x 10  10 lb KB RDL x 10   Heel raise on wedge <-> deep squat x 10  Stair stretch x 10  Assist deep squat  10 12\" x 8 L stretch x 10  Hamstring swoop x 10  10 lb KB RDL x 10   Wt bearing on surgical knee     On chair  2 min     Heel raises  Heel slide with OP 8 x 3 sec holds       Assisted squat TRX assisted  12 \" box  2 x 10 reps        TKE SAQ Laser for visual target  3 min    Prone TKE 2 x 10 reps                THERAPEUTIC ACTIVITY: ( 15 minutes):    Therapeutic activities per grid below to improve mobility, strength, coordination, and dynamic movement of upper body, lower body, and trunk to improve functional lifting, carrying, reaching, catching, and overhead activites.  Required minimal visual, verbal, manual, and tactile cues to promote coordination of bilateral, upper extremity(s), lower extremity(s) and promote motor control of bilateral, upper extremity(s), lower extremity(s).     Date:  7/30/24 Date:  8/1/24 Date:  8/6/24 Date:  8/8/24 Date:  8/13/24 Date:  8/15/24   Activity/Exercise         STS Squat therapy, wall facing  18\"  x 2 x 5    16\" x 3 x 5 reps    Squat therapy, wall facing  17\" x 8   16 \" x 2 x 5  16\", 5lb x 2 x 5  16 \"  5 lb x 5  8 lb x 5 rep  10 lb x 3 x 5 reps    DL  20 lb x 5  25 lb x 5  30 lb x 7  33 lb AMRAP - 6 reps  15 lb x 5  25 lb x 3  30 lb x 3 x 5  15 lb x 5  25 x 3   35

## 2024-08-15 NOTE — THERAPY RECERTIFICATION
maintaining a large piece of property as well as a large vegetable/fruit garden and orchard. Renetta Cope will benefit from skilled physical therapy (medically necessary) to address above deficits affecting participation in basic ADLs and functional mobility/tolerance. Patient will benefit from manual therapeutic techniques (stretching, joint mobilizations, soft tissue mobilization/myofascial release), therapeutic exercises and activities, postural strengthening/education, progressive resistance training, patient education, and comprehensive home exercises program to address current impairments and functional limitations.      Therapy Problem List: (Impacting functional limitations):    Decreased Strength, Decreased ROM, Decreased Functional Mobility, Decreased Robertson with Home Exercise Program, Decreased Posture, Decreased Body Mechanics, and Decreased Activity Tolerance/Endurance*   Therapy Prognosis:   Fair     Initial Assessment Complexity:   Moderate Complexity       PLAN   Effective Dates: 8/15/2024 TO Plan of Care/Certification Expiration Date: 10/10/24     Frequency/Duration: Plan Frequency: 1-2x/wk for 8 weeks      Interventions Planned (Treatment may consist of any combination of the following):    Home Exercise Program (HEP), Therapeutic Activites, and Therapeutic Exercise/Strengthening   GOALS: (Goals have been discussed and agreed upon with patient.)     Goals: 6 weeks  Goal Met   1. Renetta Cope will be able to perform 5 sit to stand transfers independently from a 18  inch high surface in <= 15 seconds to rise from chair/toilet at home and in the community with equal weight bearing between LEs  1.  [x] Date: 8/15/24   2. Renetta Cope will increase steady state gait speed to >= 2.62 ft/sec to improve mobility in household and community and decrease fall risk.  2.  [x] Date: 7/10/24   3. Renetta Cope will be able to push and pull 50 lbs to increase safety and functional capacity

## 2024-08-20 ENCOUNTER — HOSPITAL ENCOUNTER (OUTPATIENT)
Dept: PHYSICAL THERAPY | Age: 74
Setting detail: RECURRING SERIES
Discharge: HOME | End: 2024-08-23
Payer: MEDICARE

## 2024-08-20 PROCEDURE — 97530 THERAPEUTIC ACTIVITIES: CPT

## 2024-08-20 PROCEDURE — 97110 THERAPEUTIC EXERCISES: CPT

## 2024-08-20 NOTE — PROGRESS NOTES
Squat therapy, wall facing  17\" x 8   16 \" x 2 x 5  16\", 5lb x 2 x 5  16 \"  5 lb x 5  8 lb x 5 rep  10 lb x 3 x 5 reps  16\"  12 lb x 8 reps  15 lb x 2 x 5 reps  20 lb x 5   DL  20 lb x 5  25 lb x 5  30 lb x 7  33 lb AMRAP - 6 reps  15 lb x 5  25 lb x 3  30 lb x 3 x 5  15 lb x 5  25 x 3   35 x 3 x 5   - 116    Sled  70# push/pull 3 x 30 ft each emphasis placed on knee extension 75lb  push/pull 3 x 30 ft each emphasis placed on knee extension         Circuit   6\" step up x 10  12 lb Guaman Carry x 150 ft  3 rounds Air squat x 5  Step jacks x 10  Sled 75 lb x 60 ft    4 rounds 1/2 depth Step back lunge x 10  Lateral step over DB w/ 12 lb x 10    3 rounds   - 127           HEP Log Date    see flowsheet above 6/10/2024   2.  prone knee extension 6/27/24   3. Deep asssisted squat to target,  Prone contract/relax to quad, Supine wall slides 7/3/24   4.guaman carry      5.  Heel slides with OP, grav assist flex,          Treatment/Session Summary:      Treatment Assessment:      Pt progressing her weight bearing tolerance on surgical knee to crawling today to engage in gardening tasks. Begin fall recovery activities with continued weight bearing in knee    Communication/Consultation:      None today   Equipment provided today: HEP see log above.    Recommendations/Intent for next  treatment session:  Next visit will focus on improving mobility, strength, pain science     >Total Treatment Billable Duration:  40 min 5 min rest  Time In: 1115  Time Out: 1200    Delia Holt PT         Charge Capture  Events  [employer] C Portal  Appt Desk  Attendance Report     Future Appointments   Date Time Provider Department Center   8/22/2024 11:15 AM Delia Holt, PT [facility]   8/27/2024 11:15 AM Delia Holt, PT [facility]   8/29/2024 11:15 AM Delia Holt, PT [facility]   9/9/2024  1:00 PM Delia Holt, PT [facility]   9/11/2024 11:15 AM Delia Holt, PT [facility]

## 2024-08-22 ENCOUNTER — HOSPITAL ENCOUNTER (OUTPATIENT)
Dept: PHYSICAL THERAPY | Age: 74
Setting detail: RECURRING SERIES
Discharge: HOME | End: 2024-08-25
Payer: MEDICARE

## 2024-08-22 PROCEDURE — 97110 THERAPEUTIC EXERCISES: CPT

## 2024-08-22 PROCEDURE — 97530 THERAPEUTIC ACTIVITIES: CPT

## 2024-08-22 NOTE — PROGRESS NOTES
Renetta Cope  : 1950  Primary: Medicare Part A And B (Medicare)  Secondary: ANTHEM MEDICARE SUPP [facility] @ [place] [address] [address] [place] [address]  Phone: [phone]  Fax: [phone] Plan Frequency: 1-2x/wk for 8 weeks  Plan of Care/Certification Expiration Date: 10/10/24        Plan of Care/Certification Expiration Date:  Plan of Care/Certification Expiration Date: 10/10/24    Frequency/Duration: Plan Frequency: 1-2x/wk for 8 weeks      Time In/Out:   Time In: 1115  Time Out: 1200      PT Visit Info:         Visit Count:  21    OUTPATIENT PHYSICAL THERAPY:   Treatment Note 2024       Episode  (R TKA)               Treatment Diagnosis:    S/P total knee arthroplasty, right  Stiffness of right knee, not elsewhere classified  Right knee pain, unspecified chronicity  Muscle weakness (generalized)  Fear of falling  Difficulty in walking, not elsewhere classified  Medical/Referring Diagnosis:    Status post right knee replacement [Z96.651]    Referring Physician:  Mars Lawson MD MD Orders:  PT Eval and Treat   Return MD Appt:  24   Date of Onset:  Onset Date: 24     Allergies:   Levofloxacin, Gluten, and Morphine  Restrictions/Precautions:   Fear of falling      Interventions Planned (Treatment may consist of any combination of the following):     See Assessment Note    Subjective Comments:   Pt reporting she painted her porch railing yesterday and is having more pain and soreness in her low back and hip.     Initial Pain Level::   ache   /10  Post Session Pain Level:     ache   /10  Medications Last Reviewed:  2024  Updated Objective Findings:   Knee flexion  AAROM 100 dg, AROM knee extension  -5  dg  Treatment     THERAPEUTIC EXERCISE: ( 15 minutes):    Exercises per grid below to improve mobility, strength, balance, and coordination. Required minimal visual, verbal, manual, and tactile cues to promote proper body mechanics.

## 2024-08-27 ENCOUNTER — HOSPITAL ENCOUNTER (OUTPATIENT)
Dept: PHYSICAL THERAPY | Age: 74
Setting detail: RECURRING SERIES
Discharge: HOME | End: 2024-08-30
Payer: MEDICARE

## 2024-08-27 PROCEDURE — 97110 THERAPEUTIC EXERCISES: CPT

## 2024-08-27 PROCEDURE — 97530 THERAPEUTIC ACTIVITIES: CPT

## 2024-08-27 NOTE — PROGRESS NOTES
Renetta Cope  : 1950  Primary: Medicare Part A And B (Medicare)  Secondary: ANTHEM MEDICARE SUPP [facility] @ [place] [address] [place] [address]  Phone: [phone]  Fax: [phone] Plan Frequency: 1-2x/wk for 8 weeks  Plan of Care/Certification Expiration Date: 10/10/24        Plan of Care/Certification Expiration Date:  Plan of Care/Certification Expiration Date: 10/10/24    Frequency/Duration: Plan Frequency: 1-2x/wk for 8 weeks      Time In/Out:   Time In: 1115  Time Out: 1200      PT Visit Info:         Visit Count:  22    OUTPATIENT PHYSICAL THERAPY:   Treatment Note 2024       Episode  (R TKA)               Treatment Diagnosis:    S/P total knee arthroplasty, right  Stiffness of right knee, not elsewhere classified  Right knee pain, unspecified chronicity  Muscle weakness (generalized)  Fear of falling  Difficulty in walking, not elsewhere classified  Medical/Referring Diagnosis:    Status post right knee replacement [Z96.651]    Referring Physician:  Mars Lawson MD MD Orders:  PT Eval and Treat   Return MD Appt:  24   Date of Onset:  Onset Date: 24     Allergies:   Levofloxacin, Gluten, and Morphine  Restrictions/Precautions:   Fear of falling      Interventions Planned (Treatment may consist of any combination of the following):     See Assessment Note    Subjective Comments:   Pt reporting she is doing very well overall and plans to make homemade vanilla today    Initial Pain Level::   ache   /10  Post Session Pain Level:     ache   /10  Medications Last Reviewed:  2024  Updated Objective Findings:   Knee flexion  AAROM 100 dg, AROM knee extension  -5  dg  Treatment     THERAPEUTIC EXERCISE: ( 15 minutes):    Exercises per grid below to improve mobility, strength, balance, and coordination. Required minimal visual, verbal, manual, and tactile cues to promote proper body mechanics.  Progressed resistance and repetitions  bilateral, upper extremity(s), lower extremity(s) and promote motor control of bilateral, upper extremity(s), lower extremity(s).     Date:  7/30/24 Date:  8/1/24 Date:  8/6/24 Date:  8/8/24 Date:  8/13/24 Date:  8/15/24 Date:  8/20/24 Date:  8/22/24 Date:  8/27/24   Activity/Exercise            STS Squat therapy, wall facing  18\"  x 2 x 5    16\" x 3 x 5 reps    Squat therapy, wall facing  17\" x 8   16 \" x 2 x 5  16\", 5lb x 2 x 5  16 \"  5 lb x 5  8 lb x 5 rep  10 lb x 3 x 5 reps  16\"  12 lb x 8 reps  15 lb x 2 x 5 reps  20 lb x 5     DL  20 lb x 5  25 lb x 5  30 lb x 7  33 lb AMRAP - 6 reps  15 lb x 5  25 lb x 3  30 lb x 3 x 5  15 lb x 5  25 x 3   35 x 3 x 5   - 116   15 lb x 5  25 x 3   35 x 3   45 lb x 5  50 x 2 x 5  55 lb x 5   - 117   Sled  70# push/pull 3 x 30 ft each emphasis placed on knee extension 75lb  push/pull 3 x 30 ft each emphasis placed on knee extension       Warm up 65 lb   4 x 30 ft push/pull    Circuit   6\" step up x 10  12 lb Guaman Carry x 150 ft  3 rounds Air squat x 5  Step jacks x 10  Sled 75 lb x 60 ft    4 rounds 1/2 depth Step back lunge x 10  Lateral step over DB w/ 12 lb x 10    3 rounds   - 127   8\" step up, no UE x 10  15 lb farmer carry x 150 ft    4 rounds     Step back lunge, 1/2 depth unilateral UE support x 10  6 lb Slam ball x 10    3 rounds   -133         HEP Log Date    see flowsheet above 6/10/2024   2.  prone knee extension 6/27/24   3. Deep asssisted squat to target,  Prone contract/relax to quad, Supine wall slides 7/3/24   4.guaman carry      5.  Heel slides with OP, grav assist flex,          Treatment/Session Summary:      Treatment Assessment:      Pt continues to show significant progress in intensity of DL. Pt with occasional tendency to lead with buttock from bottom of DL but improves with cue to push feet into ground and stand. Pt is consistent with 1/2 depth step back lunge.    Communication/Consultation:      None today   Equipment

## 2024-08-29 ENCOUNTER — HOSPITAL ENCOUNTER (OUTPATIENT)
Dept: PHYSICAL THERAPY | Age: 74
Setting detail: RECURRING SERIES
End: 2024-08-29
Payer: MEDICARE

## 2024-08-29 ENCOUNTER — TELEPHONE (OUTPATIENT)
Dept: ORTHOPEDIC SURGERY | Age: 74
End: 2024-08-29

## 2024-08-29 PROCEDURE — 97530 THERAPEUTIC ACTIVITIES: CPT

## 2024-08-29 PROCEDURE — 97110 THERAPEUTIC EXERCISES: CPT

## 2024-08-29 NOTE — PROGRESS NOTES
Renetta Cope  : 1950  Primary: Medicare Part A And B (Medicare)  Secondary: ANTHEM MEDICARE SUPP [facility] @ [place] [address] [place] [address]  Phone: [phone]  Fax: [phone] Plan Frequency: 1-2x/wk for 8 weeks  Plan of Care/Certification Expiration Date: 10/10/24        Plan of Care/Certification Expiration Date:  Plan of Care/Certification Expiration Date: 10/10/24    Frequency/Duration: Plan Frequency: 1-2x/wk for 8 weeks      Time In/Out:   Time In: 1115  Time Out: 1200      PT Visit Info:         Visit Count:  23    OUTPATIENT PHYSICAL THERAPY:   Treatment Note 2024       Episode  (R TKA)               Treatment Diagnosis:    S/P total knee arthroplasty, right  Stiffness of right knee, not elsewhere classified  Right knee pain, unspecified chronicity  Muscle weakness (generalized)  Fear of falling  Difficulty in walking, not elsewhere classified  Medical/Referring Diagnosis:    Status post right knee replacement [Z96.651]    Referring Physician:  Mars Lawson MD MD Orders:  PT Eval and Treat   Return MD Appt:  24   Date of Onset:  Onset Date: 24     Allergies:   Levofloxacin, Gluten, and Morphine  Restrictions/Precautions:   Fear of falling      Interventions Planned (Treatment may consist of any combination of the following):     See Assessment Note    Subjective Comments:   Pt reporting she was very sore after last session, but still feeling good overall. Pt to f/u with surgeon in approx 2 weeks.     Initial Pain Level::   ache   /10  Post Session Pain Level:     ache   /10  Medications Last Reviewed:  2024  Updated Objective Findings:   Knee flexion  AAROM 100 dg, AROM knee extension  -5  dg  Treatment     THERAPEUTIC EXERCISE: ( 15 minutes):    Exercises per grid below to improve mobility, strength, balance, and coordination. Required minimal visual, verbal, manual, and tactile cues to promote proper body  assist flex,          Treatment/Session Summary:      Treatment Assessment:      Pt is leaving on a trip to see family and will be in car for prolonged amount of time - pt verbalizes plan to bring ice and make frequent stops to walk and around and stretch. Pt progressing to KB swings today and able to complete repetative knee flexion without issue.   Communication/Consultation:      None today   Equipment provided today: HEP see log above.    Recommendations/Intent for next  treatment session:  Next visit will focus on improving mobility, strength, pain science     >Total Treatment Billable Duration:  40 min 5 min rest  Time In: 1115  Time Out: 1200    Delia Holt PT         Charge Capture  Events  Setup Portal  Appt Desk  Attendance Report     Future Appointments   Date Time Provider Department Center   9/9/2024  1:00 PM Delia Holt, PT [facility]   9/10/2024 11:05 AM Mars Lawson MD [facility]   9/11/2024 11:15 AM Delia Holt PT [facility]   11/1/2024  1:40 PM Cruz Forrest, APRN - CNP [facility]   1/3/2025 10:00 AM [facility]TRASOUND 1 [facility]   1/3/2025 11:00 AM Deniz Villagomez MD [facility]

## 2024-08-29 NOTE — TELEPHONE ENCOUNTER
Patient doesn't have a return appt since she had her manipulation. She will be out of town from 9/2-9/9

## 2024-09-03 ENCOUNTER — APPOINTMENT (OUTPATIENT)
Dept: PHYSICAL THERAPY | Age: 74
End: 2024-09-03
Payer: MEDICARE

## 2024-09-05 ENCOUNTER — APPOINTMENT (OUTPATIENT)
Dept: PHYSICAL THERAPY | Age: 74
End: 2024-09-05
Payer: MEDICARE

## 2024-09-09 ENCOUNTER — HOSPITAL ENCOUNTER (OUTPATIENT)
Dept: PHYSICAL THERAPY | Age: 74
Setting detail: RECURRING SERIES
Discharge: HOME | End: 2024-09-12
Payer: MEDICARE

## 2024-09-09 PROCEDURE — 97110 THERAPEUTIC EXERCISES: CPT

## 2024-09-09 PROCEDURE — 97530 THERAPEUTIC ACTIVITIES: CPT

## 2024-09-10 ENCOUNTER — OFFICE VISIT (OUTPATIENT)
Dept: ORTHOPEDIC SURGERY | Age: 74
End: 2024-09-10
Payer: MEDICARE

## 2024-09-10 DIAGNOSIS — Z09 FOLLOW-UP EXAMINATION FOLLOWING SURGERY: Primary | ICD-10-CM

## 2024-09-10 PROCEDURE — G8417 CALC BMI ABV UP PARAM F/U: HCPCS | Performed by: ORTHOPAEDIC SURGERY

## 2024-09-10 PROCEDURE — 1090F PRES/ABSN URINE INCON ASSESS: CPT | Performed by: ORTHOPAEDIC SURGERY

## 2024-09-10 PROCEDURE — 1123F ACP DISCUSS/DSCN MKR DOCD: CPT | Performed by: ORTHOPAEDIC SURGERY

## 2024-09-10 PROCEDURE — G8400 PT W/DXA NO RESULTS DOC: HCPCS | Performed by: ORTHOPAEDIC SURGERY

## 2024-09-10 PROCEDURE — 3017F COLORECTAL CA SCREEN DOC REV: CPT | Performed by: ORTHOPAEDIC SURGERY

## 2024-09-10 PROCEDURE — 99213 OFFICE O/P EST LOW 20 MIN: CPT | Performed by: ORTHOPAEDIC SURGERY

## 2024-09-10 PROCEDURE — 1036F TOBACCO NON-USER: CPT | Performed by: ORTHOPAEDIC SURGERY

## 2024-09-10 PROCEDURE — G8428 CUR MEDS NOT DOCUMENT: HCPCS | Performed by: ORTHOPAEDIC SURGERY

## 2024-09-11 ENCOUNTER — HOSPITAL ENCOUNTER (OUTPATIENT)
Dept: PHYSICAL THERAPY | Age: 74
Setting detail: RECURRING SERIES
Discharge: HOME | End: 2024-09-14
Payer: MEDICARE

## 2024-09-11 PROCEDURE — 97110 THERAPEUTIC EXERCISES: CPT

## 2024-09-11 PROCEDURE — 97530 THERAPEUTIC ACTIVITIES: CPT

## 2024-09-17 ENCOUNTER — HOSPITAL ENCOUNTER (OUTPATIENT)
Dept: PHYSICAL THERAPY | Age: 74
Setting detail: RECURRING SERIES
Discharge: HOME | End: 2024-09-20
Payer: MEDICARE

## 2024-09-17 PROCEDURE — 97110 THERAPEUTIC EXERCISES: CPT

## 2024-09-17 PROCEDURE — 97530 THERAPEUTIC ACTIVITIES: CPT

## 2024-09-19 ENCOUNTER — HOSPITAL ENCOUNTER (OUTPATIENT)
Dept: PHYSICAL THERAPY | Age: 74
Setting detail: RECURRING SERIES
Discharge: HOME | End: 2024-09-22
Payer: MEDICARE

## 2024-09-19 PROCEDURE — 97110 THERAPEUTIC EXERCISES: CPT

## 2024-09-19 PROCEDURE — 97530 THERAPEUTIC ACTIVITIES: CPT

## 2024-09-26 ENCOUNTER — HOSPITAL ENCOUNTER (OUTPATIENT)
Dept: PHYSICAL THERAPY | Age: 74
Setting detail: RECURRING SERIES
Discharge: HOME | End: 2024-09-29
Payer: MEDICARE

## 2024-09-26 PROCEDURE — 97530 THERAPEUTIC ACTIVITIES: CPT

## 2024-09-26 PROCEDURE — 97110 THERAPEUTIC EXERCISES: CPT

## 2024-09-26 NOTE — PROGRESS NOTES
Renetta Cope  : 1950  Primary: Medicare Part A And B (Medicare)  Secondary: ANTHEM MEDICARE SUPP [facility] @ [place] [address] [place] [address]  Phone: [phone]  Fax: [phone] Plan Frequency: 1-2x/wk for 12 weeks  Plan of Care/Certification Expiration Date: 24        Plan of Care/Certification Expiration Date:  Plan of Care/Certification Expiration Date: 24    Frequency/Duration: Plan Frequency: 1-2x/wk for 12 weeks      Time In/Out:   Time In: 1115  Time Out: 1200      PT Visit Info:         Visit Count:  28    OUTPATIENT PHYSICAL THERAPY:   Treatment Note 2024       Episode  (R TKA)               Treatment Diagnosis:    S/P total knee arthroplasty, right  Stiffness of right knee, not elsewhere classified  Right knee pain, unspecified chronicity  Muscle weakness (generalized)  Fear of falling  Difficulty in walking, not elsewhere classified  Medical/Referring Diagnosis:    Status post right knee replacement [Z96.651]    Referring Physician:  Mars Lawson MD MD Orders:  PT Eval and Treat   Return MD Appt:  24   Date of Onset:  Onset Date: 24     Allergies:   Levofloxacin, Gluten, and Morphine  Restrictions/Precautions:   Fear of falling      Interventions Planned (Treatment may consist of any combination of the following):     See Assessment Note    Subjective Comments:   Pt with no specific reported functional changes.     Initial Pain Level::   ache   /10  Post Session Pain Level:     ache   /10  Medications Last Reviewed:  2024  Updated Objective Findings:   Knee flexion  AAROM 105 dg, AROM knee extension  -7  dg  Treatment     THERAPEUTIC EXERCISE: ( 17 minutes):    Exercises per grid below to improve mobility, strength, balance, and coordination. Required minimal visual, verbal, manual, and tactile cues to promote proper body mechanics.  Progressed resistance and repetitions as indicated.     Date:  24

## 2024-10-01 ENCOUNTER — APPOINTMENT (OUTPATIENT)
Dept: PHYSICAL THERAPY | Age: 74
End: 2024-10-01
Payer: MEDICARE

## 2024-10-03 ENCOUNTER — HOSPITAL ENCOUNTER (OUTPATIENT)
Dept: PHYSICAL THERAPY | Age: 74
Setting detail: RECURRING SERIES
Discharge: HOME | End: 2024-10-06
Payer: MEDICARE

## 2024-10-03 PROCEDURE — 97110 THERAPEUTIC EXERCISES: CPT

## 2024-10-03 PROCEDURE — 97530 THERAPEUTIC ACTIVITIES: CPT

## 2024-10-03 NOTE — PROGRESS NOTES
progressed to 16\" STS indicating improving functional knee flexion range. Pt to continue to develop SL strength especially in quads.    Communication/Consultation:      None today   Equipment provided today: HEP see log above.    Recommendations/Intent for next  treatment session:  Next visit will focus on improving mobility, strength, pain science     >Total Treatment Billable Duration:  40 min 5 min rest  Time In: 0945  Time Out: 1030    Delia Holt PT         Charge Capture  Events  [employer] Portal  Appt Desk  Attendance Report     Future Appointments   Date Time Provider Department Center   10/8/2024 11:15 AM Natalio Holtyson R, PT [facility]   10/10/2024 10:30 AM Natalio Holtyson R, PT [facility]   10/15/2024 10:30 AM Natalio Holtyson R, PT [facility]   10/17/2024  9:45 AM Natalio Holtyson R, PT [facility]   10/22/2024 10:30 AM Natalio Holtyson R, PT [facility]   10/24/2024 10:30 AM IndNatalio canalesyson R, PT [facility]   10/29/2024 10:30 AM Natalio Holtyson R, PT [facility]   10/31/2024  9:45 AM Natalio Holtyson R, PT [facility]   11/1/2024  1:40 PM Cruz Forrest, APRN - CNP [facility]   11/5/2024 11:15 AM Natalio Holtyson R, PT [facility]   11/7/2024 11:15 AM Indally Delia R, PT [facility]   11/12/2024 11:15 AM IndNatalio canalesyson R, PT [facility]   11/14/2024 11:15 AM Indally Delia R, PT [facility]   11/19/2024 11:15 AM IndNatalio canalesyson R, PT [facility]   11/21/2024 11:15 AM Natalio Holtyson R, PT [facility]   11/26/2024 11:15 AM Delia Holt, PT [facility]   12/3/2024 11:15 AM Delia Holt, PT [facility]   12/5/2024 11:15 AM Delia Holt, PT [facility]   12/10/2024 10:05 AM Mars Lawson MD [facility]   1/3/2025 10:00 AM [facility] ULTRASOUND 1 [facility]   1/3/2025 11:00 AM Deniz Villagomez MD [facility]

## 2024-10-08 ENCOUNTER — HOSPITAL ENCOUNTER (OUTPATIENT)
Dept: PHYSICAL THERAPY | Age: 74
Setting detail: RECURRING SERIES
Discharge: HOME | End: 2024-10-11
Payer: MEDICARE

## 2024-10-08 PROCEDURE — 97110 THERAPEUTIC EXERCISES: CPT

## 2024-10-08 PROCEDURE — 97530 THERAPEUTIC ACTIVITIES: CPT
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
## 2024-10-10 ENCOUNTER — HOSPITAL ENCOUNTER (OUTPATIENT)
Dept: PHYSICAL THERAPY | Age: 74
Setting detail: RECURRING SERIES
Discharge: HOME | End: 2024-10-13
Payer: MEDICARE

## 2024-10-10 PROCEDURE — 97110 THERAPEUTIC EXERCISES: CPT

## 2024-10-10 PROCEDURE — 97530 THERAPEUTIC ACTIVITIES: CPT

## 2024-10-10 NOTE — PROGRESS NOTES
Renetta Cope  : 1950  Primary: Medicare Part A And B (Medicare)  Secondary: ANTHEM MEDICARE SUPP [facility] @ [place] [address] [place] [address]  Phone: [phone]  Fax: [phone] Plan Frequency: 1-2x/wk for 12 weeks  Plan of Care/Certification Expiration Date: 24        Plan of Care/Certification Expiration Date:  Plan of Care/Certification Expiration Date: 24    Frequency/Duration: Plan Frequency: 1-2x/wk for 12 weeks      Time In/Out:   Time In: 1030  Time Out: 1115      PT Visit Info:         Visit Count:  31    OUTPATIENT PHYSICAL THERAPY:   Treatment Note 10/10/2024       Episode  (R TKA)               Treatment Diagnosis:    S/P total knee arthroplasty, right  Stiffness of right knee, not elsewhere classified  Right knee pain, unspecified chronicity  Muscle weakness (generalized)  Fear of falling  Difficulty in walking, not elsewhere classified  Medical/Referring Diagnosis:    Status post right knee replacement [Z96.651]    Referring Physician:  Mars Lawson MD MD Orders:  PT Eval and Treat   Return MD Appt:  24   Date of Onset:  Onset Date: 24     Allergies:   Levofloxacin, Gluten, and Morphine  Restrictions/Precautions:   Fear of falling      Interventions Planned (Treatment may consist of any combination of the following):     See Assessment Note    Subjective Comments:   Pt reporting she is feeling a bit dizzy today like her inner ear is acting up    Initial Pain Level::   ache   /10  Post Session Pain Level:     ache   /10  Medications Last Reviewed:  10/10/2024  Updated Objective Findings:   Knee flexion  AAROM 105 dg, AROM knee extension  -7  dg  Treatment     THERAPEUTIC EXERCISE: ( 15 minutes):    Exercises per grid below to improve mobility, strength, balance, and coordination. Required minimal visual, verbal, manual, and tactile cues to promote proper body mechanics.  Progressed resistance and repetitions as

## 2024-10-15 ENCOUNTER — HOSPITAL ENCOUNTER (OUTPATIENT)
Dept: PHYSICAL THERAPY | Age: 74
Setting detail: RECURRING SERIES
Discharge: HOME | End: 2024-10-18
Payer: MEDICARE

## 2024-10-15 PROCEDURE — 97530 THERAPEUTIC ACTIVITIES: CPT

## 2024-10-15 PROCEDURE — 97110 THERAPEUTIC EXERCISES: CPT
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
## 2024-10-17 ENCOUNTER — HOSPITAL ENCOUNTER (OUTPATIENT)
Dept: PHYSICAL THERAPY | Age: 74
Setting detail: RECURRING SERIES
Discharge: HOME | End: 2024-10-20
Payer: MEDICARE

## 2024-10-17 PROCEDURE — 97110 THERAPEUTIC EXERCISES: CPT

## 2024-10-17 PROCEDURE — 97530 THERAPEUTIC ACTIVITIES: CPT

## 2024-10-17 NOTE — PROGRESS NOTES
manual, and tactile cues to promote proper body mechanics.  Progressed resistance and repetitions as indicated.     Date:  9/11/24 Date:  9/17/24 Date:  9/19/24 Date:  9/26/24 Date:  10/3/24 Date:  10/8/24 Date:  10/10/24 Date:  10/15/24 Date:  10/17/24   Activity/Exercise            Education Assessment of progress toward goals and ROM.       Education and reinforcement on protein intake with min goal of 90 grams.    Treadmill Incline 5.5%, 2.0 mph x 8 min,  Incline 7-12%, 2.0 mph x 8 min,  -114 Incline 7-12%, 2.0 mph x 8 min, HR 85 6-10% x 8 min   Self selected speed 7-10% x 8 min   Self selected speed 7-11% x 8 min   Self selected speed Treadmill x 8 min, 12% - 15% grade, 2.7 - 3 mph Treadmill x 8 min, 12% - 15% grade, 2.7 - 3 mph Treadmill x 8 min, 12% - 15% grade, 2.7 - 3 mph  HR 97 -104   Warm up Assisted deep squat x 10  Marching x 30 ft  Toy soldier x 30 ft  Side stepping L/R x 30 ft Marching x 60 ft   Toy soldier x 60 ft  [place] curl 15 lb x 10 Marching x 60 ft   Toy soldier x 60 ft  Teal Side stepping L/R x 30 ft  Fwd lunge x 60 ft Marching x 60 ft   Toy soldier x 60 ft  Side stepping L/R x 30 ft  Fwd lunge x 60 ft Marching x 60 ft   Toy soldier x 60 ft  8 lb Side stepping L/R x 30 ft  Fwd lunge x 60 ft  Assisted deep squat x 8 Marching x 60 ft   Toy soldier x 60 ft  8 lb Side stepping L/R x 30 ft  Fwd lunge 60 ft  Negative [place] curl x 10 Marching x 60 ft   Toy soldier x 60 ft  8 lb Side stepping L/R x 30 ft  Fwd lunge 60 ft  Pass thru x 10 Marching x 60 ft  Walking swoops x 60 ft  10 lb side stepping x 60 ft  Negative [place] curl 10 lb x 8 reps Marching x 60 ft  Toy soldier x 60 ft  Sidestepping 10 lbx 60 ft  Assisted deep squat x 10  Front rack stretch x 10       THERAPEUTIC ACTIVITY: ( 23 minutes):    Therapeutic activities per grid below to improve mobility, strength, coordination, and dynamic movement of upper body, lower body, and trunk to improve functional

## 2024-10-22 ENCOUNTER — HOSPITAL ENCOUNTER (OUTPATIENT)
Dept: PHYSICAL THERAPY | Age: 74
Setting detail: RECURRING SERIES
Discharge: HOME | End: 2024-10-25
Payer: MEDICARE

## 2024-10-22 PROCEDURE — 97530 THERAPEUTIC ACTIVITIES: CPT

## 2024-10-22 PROCEDURE — 97110 THERAPEUTIC EXERCISES: CPT
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
## 2024-10-24 ENCOUNTER — HOSPITAL ENCOUNTER (OUTPATIENT)
Dept: PHYSICAL THERAPY | Age: 74
Setting detail: RECURRING SERIES
Discharge: HOME | End: 2024-10-27
Payer: MEDICARE

## 2024-10-24 PROCEDURE — 97110 THERAPEUTIC EXERCISES: CPT

## 2024-10-24 PROCEDURE — 97530 THERAPEUTIC ACTIVITIES: CPT

## 2024-10-24 NOTE — PROGRESS NOTES
improve mobility, strength, coordination, and dynamic movement of upper body, lower body, and trunk to improve functional lifting, carrying, reaching, catching, and overhead activites.  Required minimal visual, verbal, manual, and tactile cues to promote coordination of bilateral, upper extremity(s), lower extremity(s) and promote motor control of bilateral, upper extremity(s), lower extremity(s).     Date:  9/17/24 Date:  9/19/24 Date:  9/26/24 Date:  10/3/24 Date:  10/8/24 Date:  10/10/24 Date:  10/15/24 Date:  10/17/24 Date:  10/22/24 Date:  10/24/24   Activity/Exercise             STS  20\" 10 lb x 5  18\" 10 lb x 5  19\", 15 lb x 3 x 5   16\"  3 x 10 reps    SL STS  24\", PVC pipe for balance  4 x 5 reps  Back squat PVC pipe 18\" -> 20” PVC pipe x 10  15 lb x 5  20 lb x 5  25 lb x 10  Back squat x 10  Front rack Squat, 15 lb barbell  3 x 5 reps     KB Swing        15 lb x 3 x 10 reps     DL 45 lb x 5  55 lb x 3  60 lb x 3  65 lb x 3 x 5      55 x 4  65 x 2  70 x 3 x 5    - 117  55lb  x 5  65 lb x 3  75 lb x 3 x 5    HR 95-97  55 lb x 5  65 lb x 2  75 lb x 5   RPE 8     45 lb x 4  55 x 2  65  x 3 x 6  70 x 3   Circuit 15 lb KB swing x 8  Sled 80 lb x 30 ft    3 rounds 15 lb KB Swing x 10  20 lb tidal tank walk x 120 ft    3 rounds    -127 Step up and over12 \" x 10  Sled push/pull 75 lb x 60 ft    2 rounds    -134  Heel elevated  BW squat x 5   inch worms x 3 15 lb bear hug med ball carry x 150 ft  70 lb sled push/pull  3 rounds  RPE 8-8.5 Heel elevated BW squats x 8  12\" step up x 10  15 lb med ball carry x 150 ft    2 rounds  Sled 70 lb push/pull x 30 ft  Heel elevated BW squats x 8  Step back lunge w/ PVC pipe x 8    3 rounds  -115 Standing <-> high plank  Lateral step over Barbell   HR 92  10 reps total         HEP Log Date    see flowsheet above 6/10/2024   2.  prone knee extension 6/27/24   3. Deep asssisted squat to target,  Prone contract/relax to quad, Supine wall slides 7/3/24

## 2024-10-29 ENCOUNTER — HOSPITAL ENCOUNTER (OUTPATIENT)
Dept: PHYSICAL THERAPY | Age: 74
Setting detail: RECURRING SERIES
Discharge: HOME | End: 2024-11-01
Payer: MEDICARE

## 2024-10-29 PROCEDURE — 97110 THERAPEUTIC EXERCISES: CPT

## 2024-10-29 PROCEDURE — 97530 THERAPEUTIC ACTIVITIES: CPT

## 2024-10-29 NOTE — PROGRESS NOTES
Renetta Cope  : 1950  Primary: Medicare Part A And B (Medicare)  Secondary: ANTHEM MEDICARE SUPP [facility] @ [place] [address] [place] [address]  Phone: [phone]  Fax: [phone] Plan Frequency: 1-2x/wk for 12 weeks  Plan of Care/Certification Expiration Date: 24        Plan of Care/Certification Expiration Date:  Plan of Care/Certification Expiration Date: 24    Frequency/Duration: Plan Frequency: 1-2x/wk for 12 weeks      Time In/Out:   Time In: 1030  Time Out: 1115      PT Visit Info:         Visit Count:  36    OUTPATIENT PHYSICAL THERAPY:   Treatment Note 10/29/2024       Episode  (R TKA)               Treatment Diagnosis:    S/P total knee arthroplasty, right  Stiffness of right knee, not elsewhere classified  Right knee pain, unspecified chronicity  Muscle weakness (generalized)  Fear of falling  Difficulty in walking, not elsewhere classified  Medical/Referring Diagnosis:    Status post right knee replacement [Z96.651]    Referring Physician:  Mars Lawson MD MD Orders:  PT Eval and Treat   Return MD Appt:  24   Date of Onset:  Onset Date: 24     Allergies:   Levofloxacin, Gluten, and Morphine  Restrictions/Precautions:   Fear of falling      Interventions Planned (Treatment may consist of any combination of the following):     See Assessment Note    Subjective Comments:   Pt reporting she has had a sinus headache. Pt reporting her back is starting to feel better and less bothersome.     Initial Pain Level::   ache   /10  Post Session Pain Level:     ache   /10  Medications Last Reviewed:  10/29/2024  Updated Objective Findings:   Knee flexion  AAROM 105 dg, AROM knee extension  -7  dg  Treatment     THERAPEUTIC EXERCISE: ( 17 minutes):    Exercises per grid below to improve mobility, strength, balance, and coordination. Required minimal visual, verbal, manual, and tactile cues to promote proper body mechanics.

## 2024-10-31 ENCOUNTER — HOSPITAL ENCOUNTER (OUTPATIENT)
Dept: PHYSICAL THERAPY | Age: 74
Setting detail: RECURRING SERIES
Discharge: HOME | End: 2024-11-03
Payer: MEDICARE

## 2024-10-31 PROCEDURE — 97110 THERAPEUTIC EXERCISES: CPT

## 2024-10-31 NOTE — PROGRESS NOTES
Renetta Cope  : 1950  Primary: Medicare Part A And B (Medicare)  Secondary: ANTHEM MEDICARE SUPP [facility] @ [place] [address] [address] [place] [address]  Phone: [phone]  Fax: [phone] Plan Frequency: 1-2x/wk for 12 weeks  Plan of Care/Certification Expiration Date: 24        Plan of Care/Certification Expiration Date:  Plan of Care/Certification Expiration Date: 24    Frequency/Duration: Plan Frequency: 1-2x/wk for 12 weeks      Time In/Out:   Time In: 0945  Time Out: 1030      PT Visit Info:         Visit Count:  37    OUTPATIENT PHYSICAL THERAPY:   Treatment Note 10/31/2024       Episode  (R TKA)               Treatment Diagnosis:    S/P total knee arthroplasty, right  Stiffness of right knee, not elsewhere classified  Right knee pain, unspecified chronicity  Muscle weakness (generalized)  Fear of falling  Difficulty in walking, not elsewhere classified  Medical/Referring Diagnosis:    Status post right knee replacement [Z96.651]    Referring Physician:  Mars Lawson MD MD Orders:  PT Eval and Treat   Return MD Appt:  24   Date of Onset:  Onset Date: 24     Allergies:   Levofloxacin, Gluten, and Morphine  Restrictions/Precautions:   Fear of falling      Interventions Planned (Treatment may consist of any combination of the following):     See Assessment Note    Subjective Comments:   Pt reporting she has had a sinus headache. Pt reporting her back is starting to feel better and less bothersome.     Initial Pain Level::   ache   /10  Post Session Pain Level:     ache   /10  Medications Last Reviewed:  10/31/2024  Updated Objective Findings:   Knee flexion  AAROM 105 dg, AROM knee extension  -7  dg  Treatment     THERAPEUTIC EXERCISE: ( 40 minutes):    Exercises per grid below to improve mobility, strength, balance, and coordination. Required minimal visual, verbal, manual, and tactile cues to promote proper body mechanics.   flex,      6. Thrusters, Tall kneeling w/ assist of chair 10/31/24       Treatment/Session Summary:      Treatment Assessment:   Pt  is able to crawl and assume tall kneeling position, but continues to have anterior knee sensitivity. 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    Recommendations/Intent for next  treatment session:  Next visit will focus on improving mobility, strength, pain science     >Total Treatment Billable Duration:  40 min 5 min rest  Time In: 0945  Time Out: 1030    Delia Holt PT         Charge Capture  Events  [facility] Portal  Appt Desk  Attendance Report     Future Appointments   Date Time Provider Department Center   11/5/2024 11:15 AM Delia Holt, PT [facility]   11/7/2024 11:15 AM Delia Holt, PT [facility]   11/12/2024 11:15 AM Delia Holt, PT [facility]   11/14/2024 11:15 AM Delia Holt R, PT [facility]   11/19/2024 11:15 AM Delia Holt, PT [facility]   11/21/2024 11:15 AM Delia Holt, PT [facility]   11/26/2024 11:15 AM Delia Holt R, PT [facility]   12/3/2024 11:15 AM Delia Holt R, PT [facility]   12/5/2024 11:15 AM Delia Holt R, PT [facility]   12/10/2024 10:05 AM Mars Lawson MD [facility]   1/3/2025 10:00 AM [facility]   1/3/2025 11:00 AM Deniz Villagomez MD [facility]

## 2024-11-05 ENCOUNTER — HOSPITAL ENCOUNTER (OUTPATIENT)
Dept: PHYSICAL THERAPY | Age: 74
Setting detail: RECURRING SERIES
Discharge: HOME | End: 2024-11-08
Payer: MEDICARE

## 2024-11-05 PROCEDURE — 97110 THERAPEUTIC EXERCISES: CPT

## 2024-11-05 NOTE — PROGRESS NOTES
Renetta Cope  : 1950  Primary: Medicare Part A And B (Medicare)  Secondary: ANTHEM MEDICARE SUPP [facility] @ [place] [address] [place] [address]  Phone: [phone]  Fax: [phone] Plan Frequency: 1-2x/wk for 12 weeks  Plan of Care/Certification Expiration Date: 24        Plan of Care/Certification Expiration Date:  Plan of Care/Certification Expiration Date: 24    Frequency/Duration: Plan Frequency: 1-2x/wk for 12 weeks      Time In/Out:   Time In: 1115  Time Out: 1200      PT Visit Info:         Visit Count:  38    OUTPATIENT PHYSICAL THERAPY:   Treatment Note 2024       Episode  (R TKA)               Treatment Diagnosis:    S/P total knee arthroplasty, right  Stiffness of right knee, not elsewhere classified  Right knee pain, unspecified chronicity  Muscle weakness (generalized)  Fear of falling  Difficulty in walking, not elsewhere classified  Medical/Referring Diagnosis:    Status post right knee replacement [Z96.651]    Referring Physician:  Mars Lawson MD MD Orders:  PT Eval and Treat   Return MD Appt:  24   Date of Onset:  Onset Date: 24     Allergies:   Levofloxacin, Gluten, and Morphine  Restrictions/Precautions:   Fear of falling      Interventions Planned (Treatment may consist of any combination of the following):     See Assessment Note    Subjective Comments:   Pt reporting she worked on her DL and KB swings at home with her husbands equipment. Pt reports that she did pull her back and hip somehow yesterday and it does not feel great today    Initial Pain Level::   ache   /10  Post Session Pain Level:     ache   /10  Medications Last Reviewed:  2024  Updated Objective Findings:   Knee flexion  AAROM 105 dg, AROM knee extension  -7  dg  Treatment     THERAPEUTIC EXERCISE: ( 38 minutes):    Exercises per grid below to improve mobility, strength, balance, and coordination. Required minimal visual, verbal,

## 2024-11-07 ENCOUNTER — HOSPITAL ENCOUNTER (OUTPATIENT)
Dept: PHYSICAL THERAPY | Age: 74
Setting detail: RECURRING SERIES
Discharge: HOME | End: 2024-11-10
Payer: MEDICARE

## 2024-11-07 PROCEDURE — 97110 THERAPEUTIC EXERCISES: CPT

## 2024-11-07 PROCEDURE — 97530 THERAPEUTIC ACTIVITIES: CPT

## 2024-11-07 NOTE — PROGRESS NOTES
Renetta Cope  : 1950  Primary: Medicare Part A And B (Medicare)  Secondary: ANTHEM MEDICARE SUPP [facility] @ [place] [address] [place] [address]  Phone: [phone]  Fax: [phone] Plan Frequency: 1-2x/wk for 12 weeks  Plan of Care/Certification Expiration Date: 24        Plan of Care/Certification Expiration Date:  Plan of Care/Certification Expiration Date: 24    Frequency/Duration: Plan Frequency: 1-2x/wk for 12 weeks      Time In/Out:   Time In: 1115  Time Out: 1200      PT Visit Info:         Visit Count:  39    OUTPATIENT PHYSICAL THERAPY:   Treatment Note 2024       Episode  (R TKA)               Treatment Diagnosis:    S/P total knee arthroplasty, right  Stiffness of right knee, not elsewhere classified  Right knee pain, unspecified chronicity  Muscle weakness (generalized)  Fear of falling  Difficulty in walking, not elsewhere classified  Medical/Referring Diagnosis:    Status post right knee replacement [Z96.651]    Referring Physician:  Mars Lawson MD MD Orders:  PT Eval and Treat   Return MD Appt:  24   Date of Onset:  Onset Date: 24     Allergies:   Levofloxacin, Gluten, and Morphine  Restrictions/Precautions:   Fear of falling      Interventions Planned (Treatment may consist of any combination of the following):     See Assessment Note    Subjective Comments:   Pt reporting she worked on her DL and KB swings at home with her husbands equipment. Pt reports that she did pull her back and hip somehow yesterday and it does not feel great today    Initial Pain Level::   ache   /10  Post Session Pain Level:     ache   /10  Medications Last Reviewed:  2024  Updated Objective Findings:   Knee flexion  AAROM 105 dg, AROM knee extension  -7  dg  Treatment     THERAPEUTIC EXERCISE: ( 17 minutes):    Exercises per grid below to improve mobility, strength, balance, and coordination. Required minimal visual, verbal,

## 2024-11-12 ENCOUNTER — HOSPITAL ENCOUNTER (OUTPATIENT)
Dept: PHYSICAL THERAPY | Age: 74
Setting detail: RECURRING SERIES
Discharge: HOME | End: 2024-11-15
Payer: MEDICARE

## 2024-11-12 PROCEDURE — 97530 THERAPEUTIC ACTIVITIES: CPT

## 2024-11-12 PROCEDURE — 97110 THERAPEUTIC EXERCISES: CPT

## 2024-11-12 NOTE — PROGRESS NOTES
Renetta Cope  : 1950  Primary: Medicare Part A And B (Medicare)  Secondary: ANTHEM MEDICARE SUPP [facility] @ [place] [address] [address] [place] [address]  Phone: [phone]  Fax: [phone] Plan Frequency: 1-2x/wk for 12 weeks  Plan of Care/Certification Expiration Date: 24        Plan of Care/Certification Expiration Date:  Plan of Care/Certification Expiration Date: 24    Frequency/Duration: Plan Frequency: 1-2x/wk for 12 weeks      Time In/Out:   Time In: 1115  Time Out: 1200      PT Visit Info:         Visit Count:  40    OUTPATIENT PHYSICAL THERAPY:   Treatment Note 2024       Episode  (R TKA)               Treatment Diagnosis:    S/P total knee arthroplasty, right  Stiffness of right knee, not elsewhere classified  Right knee pain, unspecified chronicity  Muscle weakness (generalized)  Fear of falling  Difficulty in walking, not elsewhere classified  Medical/Referring Diagnosis:    Status post right knee replacement [Z96.651]    Referring Physician:  Mars Lawson MD MD Orders:  PT Eval and Treat   Return MD Appt:  24   Date of Onset:  Onset Date: 24     Allergies:   Levofloxacin, Gluten, and Morphine  Restrictions/Precautions:   Fear of falling      Interventions Planned (Treatment may consist of any combination of the following):     See Assessment Note    Subjective Comments:   Pt reporting she is feeling defeated because she is not gaining much motion in her knee.     Initial Pain Level::   ache   /10  Post Session Pain Level:     ache   /10  Medications Last Reviewed:  2024  Updated Objective Findings:   Knee flexion  AAROM 105 dg, AROM knee extension  -7  dg  Treatment     THERAPEUTIC EXERCISE: ( 17 minutes):    Exercises per grid below to improve mobility, strength, balance, and coordination. Required minimal visual, verbal, manual, and tactile cues to promote proper body mechanics.  Progressed resistance and

## 2024-11-14 ENCOUNTER — HOSPITAL ENCOUNTER (OUTPATIENT)
Dept: PHYSICAL THERAPY | Age: 74
Setting detail: RECURRING SERIES
Discharge: HOME | End: 2024-11-17
Payer: MEDICARE

## 2024-11-14 PROCEDURE — 97110 THERAPEUTIC EXERCISES: CPT

## 2024-11-14 NOTE — PROGRESS NOTES
tactile cues to promote coordination of bilateral, upper extremity(s), lower extremity(s) and promote motor control of bilateral, upper extremity(s), lower extremity(s).     Date:  10/10/24 Date:  10/15/24 Date:  10/17/24 Date:  10/22/24 Date:  10/24/24 Date:  10/29/24 Date:  11/7/24 Date:  11/12/24   Activity/Exercise           STS Back squat PVC pipe 18\" -> 20” PVC pipe x 10  15 lb x 5  20 lb x 5  25 lb x 10  Back squat x 10  Front rack Squat, 15 lb barbell  3 x 5 reps     Front rack Squat,   15 lb  x 5 reps  20 lb x 5 reps  25 lb x 3 x 5 reps    Available depth Front squat    20\", TNG  15 lb barbell x 2 x 5 reps  20 lb 2x 5 reps      KB Swing   15 lb x 3 x 10 reps        DL  55 lb x 5  65 lb x 2  75 lb x 5   RPE 8     45 lb x 4  55 x 2  65  x 3 x 6  70 x 3   45 lb x 5  55 lb x 2  65 lb x 3 x 8   Floor transfer        Standing <-> prone x 5   Circuit 15 lb bear hug med ball carry x 150 ft  70 lb sled push/pull  3 rounds  RPE 8-8.5 Heel elevated BW squats x 8  12\" step up x 10  15 lb med ball carry x 150 ft    2 rounds  Sled 70 lb push/pull x 30 ft  Heel elevated BW squats x 8  Step back lunge w/ PVC pipe x 8    3 rounds  -115 Standing <-> high plank  Lateral step over Barbell   HR 92  10 reps total  Burpee step out x 6  Lateral step out x 8    6 AMRAP  2  rounds          HEP Log Date    see flowsheet above 6/10/2024   2.  prone knee extension 6/27/24   3. Deep asssisted squat to target,  Prone contract/relax to quad, Supine wall slides 7/3/24   4.madison carry      5.  Heel slides with OP, grav assist flex,      6. Thrusters, Tall kneeling w/ assist of chair 10/31/24       Treatment/Session Summary:      Treatment Assessment:    Pt working on mobility drills and review how to implement at home and progressions. Pt attains 100 dg of knee flexion on bike - pt continues to show minimal changes in ROM.   Communication/Consultation:      None today   Equipment provided today: HEP see log above.

## 2024-11-19 ENCOUNTER — HOSPITAL ENCOUNTER (OUTPATIENT)
Dept: PHYSICAL THERAPY | Age: 74
Setting detail: RECURRING SERIES
Discharge: HOME | End: 2024-11-22
Payer: MEDICARE

## 2024-11-19 PROCEDURE — 97110 THERAPEUTIC EXERCISES: CPT

## 2024-11-19 PROCEDURE — 97530 THERAPEUTIC ACTIVITIES: CPT

## 2024-11-19 NOTE — PROGRESS NOTES
contract/relax to quad, Supine wall slides 7/3/24   4.madison carry      5.  Heel slides with OP, grav assist flex,      6. Thrusters, Tall kneeling w/ assist of chair 10/31/24       Treatment/Session Summary:      Treatment Assessment:    Pt with mild changes in knee ROM. Therapist and pt discuss bringing down volume and time spent in knee WB at this point given pt high level of irritability. Pt to continue to use dynamic movement patterns to encourage mobility, however not anticipated to make many significant gains in knee ROM   Communication/Consultation:      None today   Equipment provided today: HEP see log above.    Recommendations/Intent for next  treatment session:  Next visit will focus on improving mobility, strength, pain science     >Total Treatment Billable Duration:  40 min 5 min rest  Time In: 1115  Time Out: 1200    Delia Holt PT         Charge Capture  Events  [employer] Portal  Appt Desk  Attendance Report     Future Appointments   Date Time Provider Department Center   11/21/2024 11:15 AM Delia Holt, PT [facility]   11/26/2024 11:15 AM Delia Holt, PT [facility]   12/3/2024 11:15 AM Delia Holt, PT [facility]   12/5/2024 11:15 AM Delia Holt, PT [facility]   12/10/2024 10:05 AM Mars Lawson MD [facility]   1/3/2025 10:00 AM [facility]TR[facility] 1 [facility]   1/3/2025 11:00 AM Deniz Villagomez MD [facility]

## 2024-11-21 ENCOUNTER — HOSPITAL ENCOUNTER (OUTPATIENT)
Dept: PHYSICAL THERAPY | Age: 74
Setting detail: RECURRING SERIES
Discharge: HOME | End: 2024-11-24
Payer: MEDICARE

## 2024-11-21 PROCEDURE — 97530 THERAPEUTIC ACTIVITIES: CPT

## 2024-11-21 PROCEDURE — 97110 THERAPEUTIC EXERCISES: CPT

## 2024-11-21 NOTE — PROGRESS NOTES
Modified burpee step out with OH press x 8  75 lb Sled x 60 ft     2 rounds 12\" step up, 5 lb x 10  Inch worm w/ push up x 2    2 rounds         HEP Log Date    see flowsheet above 6/10/2024   2.  prone knee extension 6/27/24   3. Deep asssisted squat to target,  Prone contract/relax to quad, Supine wall slides 7/3/24   4.madison carry      5.  Heel slides with OP, grav assist flex,      6. Thrusters, Tall kneeling w/ assist of chair 10/31/24       Treatment/Session Summary:      Treatment Assessment:    Pt returning to 75 lb DL today with no back pain. Pt requires no cues for set up or bar positioning indicating good body awareness and carry over for home. Pt and  discussing joining a gym to continue after holidays.    Communication/Consultation:      None today   Equipment provided today: HEP see log above.    Recommendations/Intent for next  treatment session:  Next visit will focus on improving mobility, strength, pain science     >Total Treatment Billable Duration:  38 min 2 min rest  Time In: 1120  Time Out: 1200    Delia Holt PT         Charge Capture  Events  [employer] Portal  Appt Desk  Attendance Report     Future Appointments   Date Time Provider Department Center   11/26/2024 11:15 AM Delia Holt, PT [facility]   12/10/2024 10:05 AM Mars Lawson MD [facility]   1/3/2025 10:00 AM [facility]TR[facility] 1 [facility]   1/3/2025 11:00 AM Deniz Villagomez MD [facility]

## 2024-11-26 ENCOUNTER — HOSPITAL ENCOUNTER (OUTPATIENT)
Dept: PHYSICAL THERAPY | Age: 74
Setting detail: RECURRING SERIES
Discharge: HOME | End: 2024-11-29
Payer: MEDICARE

## 2024-11-26 PROCEDURE — 97530 THERAPEUTIC ACTIVITIES: CPT

## 2024-11-26 PROCEDURE — 97110 THERAPEUTIC EXERCISES: CPT

## 2024-11-26 NOTE — THERAPY DISCHARGE
Renetta Cope  : 1950  Primary: Medicare Part A And B (Medicare)  Secondary: ANTHEM MEDICARE SUPP [facility] @ [place] [address] [place] [address]  Phone: [phone]  Fax: [phone] Plan Frequency: 1-2x/wk for 12 weeks    Plan of Care/Certification Expiration Date: 24        Plan of Care/Certification Expiration Date:  Plan of Care/Certification Expiration Date: 24    Frequency/Duration: Plan Frequency: 1-2x/wk for 12 weeks      Time In/Out:   Time In: 1115  Time Out: 1200      PT Visit Info:         Visit Count:  44                OUTPATIENT PHYSICAL THERAPY:             Discharge Summary 2024               Episode (R TKA)         Treatment Diagnosis:     S/P total knee arthroplasty, right  Stiffness of right knee, not elsewhere classified  Right knee pain, unspecified chronicity  Muscle weakness (generalized)  Fear of falling  Difficulty in walking, not elsewhere classified  Medical/Referring Diagnosis:    Status post right knee replacement [Z96.651]    Referring Physician:  Mars Lawson MD MD Orders:  PT Eval and Treat   Return MD Appt:  24  Date of Onset:  Onset Date: 24     Allergies:  Levofloxacin, Gluten, and Morphine  Restrictions/Precautions:    None      Medications Last Reviewed:  2024       Range of Motion    [] This section not tested    AROM RIGHT (degrees) RIGHT (7/10/24) RIGHT (8/15/24) RIGHT (24) 24   Knee Extension -20 dg -7 dg -5 dg -7 dg -5 dg   Knee Flexion 82 dg 90 dg 100 dg 105 dg 103 dg         ASSESSMENT   Discharge Assessment:  Renetta Cope has met  goals over course of therapy intervention focused on improving ROM, pain management, strengthening, and functional capacity training. Pt is no longer using an AD, is has progressed in her ROM -5 to 103 dg, she is able to lift 75 lb from the ground, complete a 12\" step up, get on and off the floor,  push and pull 75 lb,

## 2024-11-26 NOTE — PROGRESS NOTES
Renetta Cope  : 1950  Primary: Medicare Part A And B (Medicare)  Secondary: ANTHEM MEDICARE SUPP [facility] @ [place] [address] [place] [address]  Phone: [phone]  Fax: [phone] Plan Frequency: 1-2x/wk for 12 weeks  Plan of Care/Certification Expiration Date: 24        Plan of Care/Certification Expiration Date:  Plan of Care/Certification Expiration Date: 24    Frequency/Duration: Plan Frequency: 1-2x/wk for 12 weeks      Time In/Out:   Time In: 1115  Time Out: 1200      PT Visit Info:         Visit Count:  44    OUTPATIENT PHYSICAL THERAPY:   Treatment Note 2024       Episode  (R TKA)               Treatment Diagnosis:    S/P total knee arthroplasty, right  Stiffness of right knee, not elsewhere classified  Right knee pain, unspecified chronicity  Muscle weakness (generalized)  Fear of falling  Difficulty in walking, not elsewhere classified  Medical/Referring Diagnosis:    Status post right knee replacement [Z96.651]    Referring Physician:  Mars Lawson MD MD Orders:  PT Eval and Treat   Return MD Appt:  24   Date of Onset:  Onset Date: 24     Allergies:   Levofloxacin, Gluten, and Morphine  Restrictions/Precautions:   Fear of falling      Interventions Planned (Treatment may consist of any combination of the following):     See Assessment Note    Subjective Comments:   Pt reporting she worked on kneeling at home and feels like she over did it because her knee is very sensitive to touch and Wbing. Pt reports she is hearing more popping and clicking, but not pain seems to be associated with popping/clicking she just feels uncomfortable with sounds    Initial Pain Level::   ache   /10  Post Session Pain Level:     ache   /10  Medications Last Reviewed:  2024  Updated Objective Findings:   Knee flexion  AAROM 103 dg, AROM knee extension  -5  dg  Treatment     THERAPEUTIC EXERCISE: ( 23 minutes):    Exercises per

## 2024-12-10 ENCOUNTER — OFFICE VISIT (OUTPATIENT)
Dept: ORTHOPEDIC SURGERY | Age: 74
End: 2024-12-10
Payer: MEDICARE

## 2024-12-10 DIAGNOSIS — M17.11 PRIMARY OSTEOARTHRITIS OF RIGHT KNEE: Primary | ICD-10-CM

## 2024-12-10 DIAGNOSIS — Z09 FOLLOW-UP EXAMINATION FOLLOWING SURGERY: ICD-10-CM

## 2024-12-10 PROCEDURE — 99213 OFFICE O/P EST LOW 20 MIN: CPT | Performed by: NURSE PRACTITIONER

## 2024-12-10 PROCEDURE — 1123F ACP DISCUSS/DSCN MKR DOCD: CPT | Performed by: NURSE PRACTITIONER

## 2024-12-10 NOTE — PROGRESS NOTES
3-4 View  Views: AP bilateral knee, skiers PA bilateral knees, lateral knee, sunrise view  Clinical indication: Knee Pain   Findings: Xrays of the knees obtained today or previously show normal appearing right knee replacement without obvious evidence of loosening/hardware failure. No obvious fracture  Impression: Normal appearing total knee replacement without obvious evidence of pathology    DARI NELSON CNP    Assessment:   Right Knee Pain after total knee replacement    Plan:  Differential diagnosis and treatment options have been discussed with the patient. Risks, benefits and alternatives of each were discussed and patient questions answered. At this point the patient has failed the aforementioned treatment modalities. The patient declines PO prednisone at this time and does not want to take medication if she does not have to.  She prefers natural remedies for pain.  She does have Meloxicam at home that she can take.  The patient would like to proceed with continued conservative care/observation including oral medications, continued stretching/conditioning/PT.  I stressed the importance of using a stationary bicycle up to 20 min/day most days of the week.  She agrees and is planning on getting a bicycle or joining a gym.   Patient will return for her scheduled 1 year follow up in 4-5 months or sooner if needed.        Signed By: DARI NELSON CNP  December 10, 2024

## 2025-01-03 ENCOUNTER — OFFICE VISIT (OUTPATIENT)
Dept: VASCULAR SURGERY | Age: 75
End: 2025-01-03
Payer: MEDICARE

## 2025-01-03 VITALS
OXYGEN SATURATION: 99 % | WEIGHT: 136 LBS | HEART RATE: 67 BPM | HEIGHT: 61 IN | DIASTOLIC BLOOD PRESSURE: 78 MMHG | BODY MASS INDEX: 25.68 KG/M2 | SYSTOLIC BLOOD PRESSURE: 133 MMHG

## 2025-01-03 DIAGNOSIS — I73.9 PVD (PERIPHERAL VASCULAR DISEASE) WITH CLAUDICATION (HCC): Primary | ICD-10-CM

## 2025-01-03 PROCEDURE — G8427 DOCREV CUR MEDS BY ELIG CLIN: HCPCS | Performed by: SURGERY

## 2025-01-03 PROCEDURE — 1159F MED LIST DOCD IN RCRD: CPT | Performed by: SURGERY

## 2025-01-03 PROCEDURE — 3017F COLORECTAL CA SCREEN DOC REV: CPT | Performed by: SURGERY

## 2025-01-03 PROCEDURE — 1036F TOBACCO NON-USER: CPT | Performed by: SURGERY

## 2025-01-03 PROCEDURE — 1123F ACP DISCUSS/DSCN MKR DOCD: CPT | Performed by: SURGERY

## 2025-01-03 PROCEDURE — G8400 PT W/DXA NO RESULTS DOC: HCPCS | Performed by: SURGERY

## 2025-01-03 PROCEDURE — 1090F PRES/ABSN URINE INCON ASSESS: CPT | Performed by: SURGERY

## 2025-01-03 PROCEDURE — 99213 OFFICE O/P EST LOW 20 MIN: CPT | Performed by: SURGERY

## 2025-01-03 PROCEDURE — G8417 CALC BMI ABV UP PARAM F/U: HCPCS | Performed by: SURGERY

## 2025-01-03 NOTE — PROGRESS NOTES
[address] [address]  [phone] FAX: [phone]    Renetta Cope  : 1950    Chief Complaint:     History of Present Illness:   Patient follows up today for follow-up duplex study.  Patient denies abdominal pain or back pain.  Patient was found to have some inflammation around her aorta on ultrasound and CT scan.  She took some antibiotics and steroid several years back since then she has been asymptomatic both images been normal.    CURRENT MEDICATIONS:  Current Outpatient Medications   Medication Sig Dispense Refill    amoxicillin (AMOXIL) 500 MG tablet Take 4 tablets by mouth 1 hour prior to any dental procedure. 12 tablet 1    aspirin (ASPIRIN 81) 81 MG EC tablet Take 1 tablet by mouth in the morning and at bedtime Take one tablet morning and evening 60 tablet 0    omeprazole (PRILOSEC) 40 MG delayed release capsule Take 1 capsule by mouth daily 30 capsule 0    Vitamin D3 125 MCG (5000 UT) TABS tablet Take 1 tablet by mouth daily      vitamin E 400 UNIT capsule Take 1 capsule by mouth daily      vitamin B-1 (THIAMINE) 100 MG tablet Take 2.5 tablets by mouth daily      vitamin C (ASCORBIC ACID) 500 MG tablet Take 1.5 tablets by mouth daily      Zinc 22.5 MG TABS Take 2 tablets by mouth daily      triamcinolone (NASACORT ALLERGY 24HR) 55 MCG/ACT nasal inhaler 2 sprays by Each Nostril route daily as needed      cetirizine (ZYRTEC) 10 MG tablet Take 1 tablet by mouth daily as needed for Allergies      levothyroxine (SYNTHROID) 100 MCG tablet Take 1 tablet by mouth every morning (before breakfast)      gabapentin (NEURONTIN) 100 MG capsule Take 1 capsule by mouth 2 times daily for 15 days. 30 capsule 0    gabapentin (NEURONTIN) 100 MG capsule Take 1 capsule by mouth 2 times daily for 15 days. 30 capsule 0    gabapentin (NEURONTIN) 300 MG capsule Take 1 capsule by mouth 2 times daily for 15 days. 30 capsule 0    gabapentin (NEURONTIN) 100 MG capsule Take 1

## 2025-04-15 ENCOUNTER — OFFICE VISIT (OUTPATIENT)
Dept: ORTHOPEDIC SURGERY | Age: 75
End: 2025-04-15
Payer: MEDICARE

## 2025-04-15 DIAGNOSIS — Z96.659 STATUS POST KNEE REPLACEMENT, UNSPECIFIED LATERALITY: Primary | ICD-10-CM

## 2025-04-15 DIAGNOSIS — Z09 FOLLOW-UP EXAMINATION FOLLOWING SURGERY: ICD-10-CM

## 2025-04-15 PROCEDURE — 1123F ACP DISCUSS/DSCN MKR DOCD: CPT | Performed by: ORTHOPAEDIC SURGERY

## 2025-04-15 PROCEDURE — 1090F PRES/ABSN URINE INCON ASSESS: CPT | Performed by: ORTHOPAEDIC SURGERY

## 2025-04-15 PROCEDURE — G8428 CUR MEDS NOT DOCUMENT: HCPCS | Performed by: ORTHOPAEDIC SURGERY

## 2025-04-15 PROCEDURE — 1036F TOBACCO NON-USER: CPT | Performed by: ORTHOPAEDIC SURGERY

## 2025-04-15 PROCEDURE — G8400 PT W/DXA NO RESULTS DOC: HCPCS | Performed by: ORTHOPAEDIC SURGERY

## 2025-04-15 PROCEDURE — 99213 OFFICE O/P EST LOW 20 MIN: CPT | Performed by: ORTHOPAEDIC SURGERY

## 2025-04-15 PROCEDURE — 3017F COLORECTAL CA SCREEN DOC REV: CPT | Performed by: ORTHOPAEDIC SURGERY

## 2025-04-15 PROCEDURE — G8417 CALC BMI ABV UP PARAM F/U: HCPCS | Performed by: ORTHOPAEDIC SURGERY

## 2025-04-15 NOTE — PROGRESS NOTES
Patient ID:  Renetta Cope  [id number]  74 y.o.  1950    Today: April 15, 2025    HISTORY:  The patient presents today approximately 1 year(s) after right knee replacement.  The patient has some stiffness of the knee. We did postop manipulation ~3 months postop at which point we got ~120 degrees of motion.    Past Medical History:  Past Medical History:   Diagnosis Date    Aortitis     followed by vascular surgery    Arthritis     GERD (gastroesophageal reflux disease)     managed with meds prn    Post-nasal drip     Prolonged emergence from general anesthesia     PVD (peripheral vascular disease)     Thyroid disease     Hypo    Vertigo        Past Surgical History:  Past Surgical History:   Procedure Laterality Date    APPENDECTOMY      COLONOSCOPY      HYSTERECTOMY (CERVIX STATUS UNKNOWN)      ORTHOPEDIC SURGERY      toe surgery, both hands    TOTAL KNEE ARTHROPLASTY Right 5/13/2024    Right KNEE TOTAL ARTHROPLASTY ROBOTIC, [place]/SATISH/ DEREK performed by Mars Lawson MD at [facility]    UROLOGICAL SURGERY      bladder         Medications:     Prior to Admission medications    Medication Sig Start Date End Date Taking? Authorizing Provider   gabapentin (NEURONTIN) 100 MG capsule Take 1 capsule by mouth 2 times daily for 15 days. 7/8/24 7/23/24  Mars Lawson MD   gabapentin (NEURONTIN) 100 MG capsule Take 1 capsule by mouth 2 times daily for 15 days. 6/24/24 7/9/24  Mars Lawson MD   gabapentin (NEURONTIN) 300 MG capsule Take 1 capsule by mouth 2 times daily for 15 days. 6/20/24 7/5/24  Mars Lawson MD   amoxicillin (AMOXIL) 500 MG tablet Take 4 tablets by mouth 1 hour prior to any dental procedure. 6/5/24   Sam Curiel APRN - CNP   gabapentin (NEURONTIN) 100 MG capsule Take 1 capsule by mouth 2 times daily for 15 days. 5/29/24 6/13/24  Mars Lawson MD   acetaminophen (TYLENOL) 500 MG tablet Take 2 tablets by mouth every 6 hours as needed for Pain  Patient not taking:

## (undated) DEVICE — HOOD: Brand: T7PLUS

## (undated) DEVICE — SUTURE MONOCRYL SZ 2-0 L27IN ABSRB UD CP-1 1 L36MM 1/2 CIR REV Y266H

## (undated) DEVICE — SOLUTION IV 250ML 0.9% SOD CHL PH 5 INJ USP VIAFLX PLAS

## (undated) DEVICE — SOLUTION IRRIG 1000ML 0.9% SOD CHL USP POUR PLAS BTL

## (undated) DEVICE — 450 ML BOTTLE OF 0.05% CHLORHEXIDINE GLUCONATE IN 99.95% STERILE WATER FOR IRRIGATION, USP AND APPLICATOR.: Brand: IRRISEPT ANTIMICROBIAL WOUND LAVAGE

## (undated) DEVICE — KIT INT FIX FEM TIB CKPT MAKOPLASTY

## (undated) DEVICE — DRAPE,TOP,102X53,STERILE: Brand: MEDLINE

## (undated) DEVICE — PREVENA INCISION MANAGEMENT SYSTEM- PEEL & PLACE DRESSING: Brand: PREVENA™ PEEL & PLACE™

## (undated) DEVICE — YANKAUER,FLEXIBLE HANDLE,REGLR CAPACITY: Brand: MEDLINE INDUSTRIES, INC.

## (undated) DEVICE — STERILE PVP: Brand: MEDLINE INDUSTRIES, INC.

## (undated) DEVICE — STRYKER PERFORMANCE SERIES SAGITTAL BLADE: Brand: STRYKER PERFORMANCE SERIES

## (undated) DEVICE — PIN BNE FIX TEMP L140MM DIA4MM MAKO

## (undated) DEVICE — SUTURE ABSORBABLE MONOFILAMENT 1 CTX 36 CM 48 MM VIO PDS +

## (undated) DEVICE — BIPOLAR SEALER 23-112-1 AQM 6.0: Brand: AQUAMANTYS ®

## (undated) DEVICE — STERILE PRESSURE PROTECTOR PAD® FOR DE MAYO UNIVERSAL DISTRACTOR® (10/CASE): Brand: DE MAYO UNIVERSAL DISTRACTOR®

## (undated) DEVICE — STERILE SYNTHETIC POLYISOPRENE POWDER-FREE SURGICAL GLOVES WITH HYDROGEL COATING, SMOOTH FINISH, STRAIGHT FINGER: Brand: PROTEXIS

## (undated) DEVICE — PIN BNE FIX TEMP L110MM DIA4MM MAKO

## (undated) DEVICE — KIT DRP FOR RIO ROBOTIC ARM ASST SYS

## (undated) DEVICE — SOLUTION IRRIG 1000ML STRL H2O USP PLAS POUR BTL

## (undated) DEVICE — SUTURE MONOCRYL STRATAFIX SPRL + SZ 2-0 L18IN ABSRB UD CT-1 SXMP1B413

## (undated) DEVICE — GLOVE SURG SZ 65 THK91MIL LTX FREE SYN POLYISOPRENE

## (undated) DEVICE — GLOVE ORANGE PI 8   MSG9080

## (undated) DEVICE — GLOVE SURG SZ 8 L12IN FNGR THK79MIL GRN LTX FREE

## (undated) DEVICE — GLOVE SURG SZ 65 L12IN FNGR THK79MIL GRN LTX FREE

## (undated) DEVICE — KIT TRK KNEE PROC VIZADISC

## (undated) DEVICE — SOLUTION IRRIG 3000ML 0.9% SOD CHL USP UROMATIC PLAS CONT

## (undated) DEVICE — TOTAL KNEE DR WATSON: Brand: MEDLINE INDUSTRIES, INC.